# Patient Record
Sex: FEMALE | Race: WHITE | NOT HISPANIC OR LATINO | Employment: OTHER | RURAL
[De-identification: names, ages, dates, MRNs, and addresses within clinical notes are randomized per-mention and may not be internally consistent; named-entity substitution may affect disease eponyms.]

---

## 2017-10-11 ENCOUNTER — HISTORICAL (OUTPATIENT)
Dept: ADMINISTRATIVE | Facility: HOSPITAL | Age: 57
End: 2017-10-11

## 2017-10-13 LAB
LAB AP CLINICAL INFORMATION: NORMAL
LAB AP DIAGNOSIS - HISTORICAL: NORMAL
LAB AP GROSS PATHOLOGY - HISTORICAL: NORMAL
LAB AP SPECIMEN SUBMITTED - HISTORICAL: NORMAL

## 2020-09-23 ENCOUNTER — HISTORICAL (OUTPATIENT)
Dept: ADMINISTRATIVE | Facility: HOSPITAL | Age: 60
End: 2020-09-23

## 2020-10-13 ENCOUNTER — HISTORICAL (OUTPATIENT)
Dept: ADMINISTRATIVE | Facility: HOSPITAL | Age: 60
End: 2020-10-13

## 2020-10-13 LAB
CHOLEST SERPL-MCNC: 256 MG/DL
CHOLEST/HDLC SERPL: 3.5 {RATIO}
GLUCOSE SERPL-MCNC: 98 MG/DL (ref 74–106)
HDLC SERPL-MCNC: 73 MG/DL
LDLC SERPL CALC-MCNC: 151 MG/DL
TRIGL SERPL-MCNC: 159 MG/DL

## 2021-06-16 DIAGNOSIS — E78.2 MIXED HYPERLIPIDEMIA: Primary | ICD-10-CM

## 2021-06-17 RX ORDER — LOVASTATIN 10 MG/1
TABLET ORAL
Qty: 30 TABLET | Refills: 0 | Status: SHIPPED | OUTPATIENT
Start: 2021-06-17 | End: 2021-07-13

## 2021-07-13 DIAGNOSIS — E78.2 MIXED HYPERLIPIDEMIA: ICD-10-CM

## 2021-07-13 RX ORDER — LOVASTATIN 10 MG/1
TABLET ORAL
Qty: 30 TABLET | Refills: 2 | Status: SHIPPED | OUTPATIENT
Start: 2021-07-13 | End: 2021-09-09

## 2021-09-09 DIAGNOSIS — E78.2 MIXED HYPERLIPIDEMIA: ICD-10-CM

## 2021-09-09 RX ORDER — LOVASTATIN 10 MG/1
TABLET ORAL
Qty: 30 TABLET | Refills: 2 | Status: SHIPPED | OUTPATIENT
Start: 2021-09-09 | End: 2022-02-24

## 2021-09-13 RX ORDER — FLUTICASONE PROPIONATE 0.5 MG/G
CREAM TOPICAL
Qty: 30 G | Refills: 2 | Status: SHIPPED | OUTPATIENT
Start: 2021-09-13 | End: 2022-08-29

## 2021-10-20 ENCOUNTER — OFFICE VISIT (OUTPATIENT)
Dept: FAMILY MEDICINE | Facility: CLINIC | Age: 61
End: 2021-10-20

## 2021-10-20 VITALS
SYSTOLIC BLOOD PRESSURE: 110 MMHG | BODY MASS INDEX: 27.56 KG/M2 | OXYGEN SATURATION: 97 % | TEMPERATURE: 97 F | HEART RATE: 80 BPM | WEIGHT: 146 LBS | RESPIRATION RATE: 18 BRPM | HEIGHT: 61 IN | DIASTOLIC BLOOD PRESSURE: 70 MMHG

## 2021-10-20 DIAGNOSIS — Z00.00 ROUTINE MEDICAL EXAM: Primary | ICD-10-CM

## 2021-10-20 DIAGNOSIS — Z13.220 SCREENING FOR LIPID DISORDERS: ICD-10-CM

## 2021-10-20 DIAGNOSIS — Z11.59 SCREENING FOR VIRAL DISEASE: ICD-10-CM

## 2021-10-20 DIAGNOSIS — Z13.1 SCREENING FOR DIABETES MELLITUS: ICD-10-CM

## 2021-10-20 LAB
CHOLEST SERPL-MCNC: 189 MG/DL (ref 0–200)
CHOLEST/HDLC SERPL: 2.5 {RATIO}
GLUCOSE SERPL-MCNC: 100 MG/DL (ref 74–106)
HDLC SERPL-MCNC: 77 MG/DL (ref 40–60)
LDLC SERPL CALC-MCNC: 98 MG/DL
LDLC/HDLC SERPL: 1.3 {RATIO}
NONHDLC SERPL-MCNC: 112 MG/DL
TRIGL SERPL-MCNC: 68 MG/DL (ref 35–150)
VLDLC SERPL-MCNC: 14 MG/DL

## 2021-10-20 PROCEDURE — 80061 LIPID PANEL: ICD-10-PCS | Mod: ,,, | Performed by: CLINICAL MEDICAL LABORATORY

## 2021-10-20 PROCEDURE — 82947 ASSAY GLUCOSE BLOOD QUANT: CPT | Mod: ,,, | Performed by: CLINICAL MEDICAL LABORATORY

## 2021-10-20 PROCEDURE — 86769 SARS-COV-2 COVID-19 ANTIBODY: CPT | Mod: 90,,, | Performed by: CLINICAL MEDICAL LABORATORY

## 2021-10-20 PROCEDURE — 99396 PREV VISIT EST AGE 40-64: CPT | Mod: ,,, | Performed by: NURSE PRACTITIONER

## 2021-10-20 PROCEDURE — 82947 GLUCOSE, FASTING: ICD-10-PCS | Mod: ,,, | Performed by: CLINICAL MEDICAL LABORATORY

## 2021-10-20 PROCEDURE — 80061 LIPID PANEL: CPT | Mod: ,,, | Performed by: CLINICAL MEDICAL LABORATORY

## 2021-10-20 PROCEDURE — 86769 SARS-COV-2 SPIKE AB, SEMI-QUANT, S: ICD-10-PCS | Mod: 90,,, | Performed by: CLINICAL MEDICAL LABORATORY

## 2021-10-20 PROCEDURE — 99396 PR PREVENTIVE VISIT,EST,40-64: ICD-10-PCS | Mod: ,,, | Performed by: NURSE PRACTITIONER

## 2021-10-20 RX ORDER — NAPROXEN 500 MG/1
500 TABLET ORAL 2 TIMES DAILY PRN
COMMUNITY
Start: 2021-09-09 | End: 2023-01-23 | Stop reason: SDUPTHER

## 2021-10-20 RX ORDER — CLORAZEPATE DIPOTASSIUM 3.75 MG/1
3.75 TABLET ORAL 3 TIMES DAILY
COMMUNITY
Start: 2021-10-12 | End: 2022-01-27 | Stop reason: SDUPTHER

## 2021-10-20 RX ORDER — LANSOPRAZOLE 30 MG/1
CAPSULE, DELAYED RELEASE ORAL
COMMUNITY
Start: 2021-10-07 | End: 2023-01-23 | Stop reason: SDUPTHER

## 2021-10-22 LAB
M SARS-COV-2 SPIKE AB, INTERP, S: POSITIVE
M SARS-COV-2 SPIKE AB, QUANT, S: 64 U/ML

## 2022-01-04 ENCOUNTER — OFFICE VISIT (OUTPATIENT)
Dept: FAMILY MEDICINE | Facility: CLINIC | Age: 62
End: 2022-01-04
Payer: COMMERCIAL

## 2022-01-04 VITALS
SYSTOLIC BLOOD PRESSURE: 122 MMHG | HEIGHT: 61 IN | RESPIRATION RATE: 17 BRPM | HEART RATE: 78 BPM | WEIGHT: 141 LBS | DIASTOLIC BLOOD PRESSURE: 74 MMHG | OXYGEN SATURATION: 97 % | TEMPERATURE: 96 F | BODY MASS INDEX: 26.62 KG/M2

## 2022-01-04 DIAGNOSIS — F41.9 ANXIETY: ICD-10-CM

## 2022-01-04 DIAGNOSIS — Z20.822 EXPOSURE TO COVID-19 VIRUS: ICD-10-CM

## 2022-01-04 DIAGNOSIS — J40 BRONCHITIS: Primary | ICD-10-CM

## 2022-01-04 LAB
CTP QC/QA: YES
FLUAV AG NPH QL: NEGATIVE
FLUBV AG NPH QL: NEGATIVE
SARS-COV-2 AG RESP QL IA.RAPID: NEGATIVE

## 2022-01-04 PROCEDURE — 1159F PR MEDICATION LIST DOCUMENTED IN MEDICAL RECORD: ICD-10-PCS | Mod: CPTII,,, | Performed by: NURSE PRACTITIONER

## 2022-01-04 PROCEDURE — 99213 PR OFFICE/OUTPT VISIT, EST, LEVL III, 20-29 MIN: ICD-10-PCS | Mod: 25,,, | Performed by: NURSE PRACTITIONER

## 2022-01-04 PROCEDURE — 3074F SYST BP LT 130 MM HG: CPT | Mod: CPTII,,, | Performed by: NURSE PRACTITIONER

## 2022-01-04 PROCEDURE — 87428 SARSCOV & INF VIR A&B AG IA: CPT | Mod: QW,,, | Performed by: NURSE PRACTITIONER

## 2022-01-04 PROCEDURE — 87428 POCT SARS-COV2 (COVID) WITH FLU ANTIGEN: ICD-10-PCS | Mod: QW,,, | Performed by: NURSE PRACTITIONER

## 2022-01-04 PROCEDURE — 1160F RVW MEDS BY RX/DR IN RCRD: CPT | Mod: CPTII,,, | Performed by: NURSE PRACTITIONER

## 2022-01-04 PROCEDURE — 3074F PR MOST RECENT SYSTOLIC BLOOD PRESSURE < 130 MM HG: ICD-10-PCS | Mod: CPTII,,, | Performed by: NURSE PRACTITIONER

## 2022-01-04 PROCEDURE — 3078F DIAST BP <80 MM HG: CPT | Mod: CPTII,,, | Performed by: NURSE PRACTITIONER

## 2022-01-04 PROCEDURE — 1160F PR REVIEW ALL MEDS BY PRESCRIBER/CLIN PHARMACIST DOCUMENTED: ICD-10-PCS | Mod: CPTII,,, | Performed by: NURSE PRACTITIONER

## 2022-01-04 PROCEDURE — 96372 PR INJECTION,THERAP/PROPH/DIAG2ST, IM OR SUBCUT: ICD-10-PCS | Mod: ,,, | Performed by: NURSE PRACTITIONER

## 2022-01-04 PROCEDURE — 3008F PR BODY MASS INDEX (BMI) DOCUMENTED: ICD-10-PCS | Mod: CPTII,,, | Performed by: NURSE PRACTITIONER

## 2022-01-04 PROCEDURE — 3008F BODY MASS INDEX DOCD: CPT | Mod: CPTII,,, | Performed by: NURSE PRACTITIONER

## 2022-01-04 PROCEDURE — 1159F MED LIST DOCD IN RCRD: CPT | Mod: CPTII,,, | Performed by: NURSE PRACTITIONER

## 2022-01-04 PROCEDURE — 3078F PR MOST RECENT DIASTOLIC BLOOD PRESSURE < 80 MM HG: ICD-10-PCS | Mod: CPTII,,, | Performed by: NURSE PRACTITIONER

## 2022-01-04 PROCEDURE — 99213 OFFICE O/P EST LOW 20 MIN: CPT | Mod: 25,,, | Performed by: NURSE PRACTITIONER

## 2022-01-04 PROCEDURE — 96372 THER/PROPH/DIAG INJ SC/IM: CPT | Mod: ,,, | Performed by: NURSE PRACTITIONER

## 2022-01-04 RX ORDER — PROMETHAZINE HYDROCHLORIDE AND DEXTROMETHORPHAN HYDROBROMIDE 6.25; 15 MG/5ML; MG/5ML
5 SYRUP ORAL NIGHTLY PRN
Qty: 180 ML | Refills: 0 | Status: SHIPPED | OUTPATIENT
Start: 2022-01-04 | End: 2022-01-14

## 2022-01-04 RX ORDER — METHYLPREDNISOLONE 4 MG/1
4 TABLET ORAL
Qty: 21 TABLET | Refills: 0 | Status: SHIPPED | OUTPATIENT
Start: 2022-01-04 | End: 2022-01-10

## 2022-01-04 RX ORDER — LINCOMYCIN HYDROCHLORIDE 300 MG/ML
600 INJECTION, SOLUTION INTRAMUSCULAR; INTRAVENOUS; SUBCONJUNCTIVAL
Status: COMPLETED | OUTPATIENT
Start: 2022-01-04 | End: 2022-01-04

## 2022-01-04 RX ORDER — AZITHROMYCIN 250 MG/1
TABLET, FILM COATED ORAL
Qty: 6 TABLET | Refills: 0 | Status: SHIPPED | OUTPATIENT
Start: 2022-01-04 | End: 2022-01-09

## 2022-01-04 RX ORDER — ALBUTEROL SULFATE 2.5 MG/.5ML
2.5 SOLUTION RESPIRATORY (INHALATION) EVERY 6 HOURS PRN
Qty: 1 EACH | Refills: 1 | Status: SHIPPED | OUTPATIENT
Start: 2022-01-04 | End: 2022-01-14

## 2022-01-04 RX ADMIN — LINCOMYCIN HYDROCHLORIDE 600 MG: 300 INJECTION, SOLUTION INTRAMUSCULAR; INTRAVENOUS; SUBCONJUNCTIVAL at 11:01

## 2022-01-14 DIAGNOSIS — J40 BRONCHITIS: ICD-10-CM

## 2022-01-14 RX ORDER — ALBUTEROL SULFATE 0.83 MG/ML
SOLUTION RESPIRATORY (INHALATION)
Qty: 75 ML | Refills: 1 | Status: SHIPPED | OUTPATIENT
Start: 2022-01-14 | End: 2022-10-13 | Stop reason: SDUPTHER

## 2022-01-27 DIAGNOSIS — F41.9 ANXIETY: Primary | ICD-10-CM

## 2022-01-28 RX ORDER — CLORAZEPATE DIPOTASSIUM 3.75 MG/1
3.75 TABLET ORAL 2 TIMES DAILY
Qty: 60 TABLET | Refills: 0 | Status: SHIPPED | OUTPATIENT
Start: 2022-01-28 | End: 2022-08-29 | Stop reason: SDUPTHER

## 2022-02-24 DIAGNOSIS — E78.2 MIXED HYPERLIPIDEMIA: ICD-10-CM

## 2022-02-24 DIAGNOSIS — F41.9 ANXIETY: ICD-10-CM

## 2022-02-24 RX ORDER — LOVASTATIN 10 MG/1
TABLET ORAL
Qty: 30 TABLET | Refills: 2 | Status: SHIPPED | OUTPATIENT
Start: 2022-02-24 | End: 2022-05-01

## 2022-02-25 RX ORDER — CLORAZEPATE DIPOTASSIUM 3.75 MG/1
TABLET ORAL
Qty: 60 TABLET | Refills: 0 | OUTPATIENT
Start: 2022-02-25

## 2022-04-04 LAB — PAP RECOMMENDATION EXT: NORMAL

## 2022-04-05 LAB — BCS RECOMMENDATION EXT: NORMAL

## 2022-04-29 DIAGNOSIS — E78.2 MIXED HYPERLIPIDEMIA: ICD-10-CM

## 2022-05-01 RX ORDER — LOVASTATIN 10 MG/1
TABLET ORAL
Qty: 30 TABLET | Refills: 2 | Status: SHIPPED | OUTPATIENT
Start: 2022-05-01 | End: 2022-08-29 | Stop reason: SDUPTHER

## 2022-08-01 DIAGNOSIS — E78.2 MIXED HYPERLIPIDEMIA: ICD-10-CM

## 2022-08-02 RX ORDER — LOVASTATIN 10 MG/1
TABLET ORAL
Qty: 30 TABLET | Refills: 2 | OUTPATIENT
Start: 2022-08-02

## 2022-08-29 ENCOUNTER — OFFICE VISIT (OUTPATIENT)
Dept: FAMILY MEDICINE | Facility: CLINIC | Age: 62
End: 2022-08-29
Payer: COMMERCIAL

## 2022-08-29 VITALS
SYSTOLIC BLOOD PRESSURE: 118 MMHG | WEIGHT: 151.81 LBS | OXYGEN SATURATION: 99 % | HEART RATE: 79 BPM | RESPIRATION RATE: 18 BRPM | BODY MASS INDEX: 28.66 KG/M2 | DIASTOLIC BLOOD PRESSURE: 72 MMHG | HEIGHT: 61 IN

## 2022-08-29 DIAGNOSIS — F41.9 ANXIETY: ICD-10-CM

## 2022-08-29 DIAGNOSIS — N20.0 KIDNEY STONE: Primary | ICD-10-CM

## 2022-08-29 DIAGNOSIS — Z79.899 ENCOUNTER FOR LONG-TERM (CURRENT) USE OF MEDICATIONS: ICD-10-CM

## 2022-08-29 DIAGNOSIS — Z87.442 PERSONAL HISTORY OF KIDNEY STONES: ICD-10-CM

## 2022-08-29 DIAGNOSIS — E78.2 MIXED HYPERLIPIDEMIA: ICD-10-CM

## 2022-08-29 DIAGNOSIS — R30.0 DYSURIA: ICD-10-CM

## 2022-08-29 PROBLEM — J40 BRONCHITIS: Status: RESOLVED | Noted: 2022-01-04 | Resolved: 2022-08-29

## 2022-08-29 LAB
BILIRUB SERPL-MCNC: NEGATIVE MG/DL
BLOOD URINE, POC: NEGATIVE
COLOR, POC UA: YELLOW
GLUCOSE UR QL STRIP: NEGATIVE
KETONES UR QL STRIP: ABNORMAL
LEUKOCYTE ESTERASE URINE, POC: ABNORMAL
NITRITE, POC UA: NEGATIVE
PH, POC UA: 6.5
PROTEIN, POC: NEGATIVE
SPECIFIC GRAVITY, POC UA: 1.01
UROBILINOGEN, POC UA: 0.2

## 2022-08-29 PROCEDURE — 99214 OFFICE O/P EST MOD 30 MIN: CPT | Mod: ,,, | Performed by: NURSE PRACTITIONER

## 2022-08-29 PROCEDURE — 3074F SYST BP LT 130 MM HG: CPT | Mod: CPTII,,, | Performed by: NURSE PRACTITIONER

## 2022-08-29 PROCEDURE — 1160F PR REVIEW ALL MEDS BY PRESCRIBER/CLIN PHARMACIST DOCUMENTED: ICD-10-PCS | Mod: CPTII,,, | Performed by: NURSE PRACTITIONER

## 2022-08-29 PROCEDURE — 87077 CULTURE AEROBIC IDENTIFY: CPT | Mod: ,,, | Performed by: CLINICAL MEDICAL LABORATORY

## 2022-08-29 PROCEDURE — 3078F PR MOST RECENT DIASTOLIC BLOOD PRESSURE < 80 MM HG: ICD-10-PCS | Mod: CPTII,,, | Performed by: NURSE PRACTITIONER

## 2022-08-29 PROCEDURE — 87186 SC STD MICRODIL/AGAR DIL: CPT | Mod: ,,, | Performed by: CLINICAL MEDICAL LABORATORY

## 2022-08-29 PROCEDURE — 87186 CULTURE, URINE: ICD-10-PCS | Mod: ,,, | Performed by: CLINICAL MEDICAL LABORATORY

## 2022-08-29 PROCEDURE — 80305 DRUG TEST PRSMV DIR OPT OBS: CPT | Mod: QW,,, | Performed by: NURSE PRACTITIONER

## 2022-08-29 PROCEDURE — 81003 URINALYSIS AUTO W/O SCOPE: CPT | Mod: QW,59,, | Performed by: NURSE PRACTITIONER

## 2022-08-29 PROCEDURE — 80305 POCT URINE DRUG SCREEN PRESUMP: ICD-10-PCS | Mod: QW,,, | Performed by: NURSE PRACTITIONER

## 2022-08-29 PROCEDURE — 1159F MED LIST DOCD IN RCRD: CPT | Mod: CPTII,,, | Performed by: NURSE PRACTITIONER

## 2022-08-29 PROCEDURE — 3008F BODY MASS INDEX DOCD: CPT | Mod: CPTII,,, | Performed by: NURSE PRACTITIONER

## 2022-08-29 PROCEDURE — 3078F DIAST BP <80 MM HG: CPT | Mod: CPTII,,, | Performed by: NURSE PRACTITIONER

## 2022-08-29 PROCEDURE — 87086 CULTURE, URINE: ICD-10-PCS | Mod: ,,, | Performed by: CLINICAL MEDICAL LABORATORY

## 2022-08-29 PROCEDURE — 1160F RVW MEDS BY RX/DR IN RCRD: CPT | Mod: CPTII,,, | Performed by: NURSE PRACTITIONER

## 2022-08-29 PROCEDURE — 99214 PR OFFICE/OUTPT VISIT, EST, LEVL IV, 30-39 MIN: ICD-10-PCS | Mod: ,,, | Performed by: NURSE PRACTITIONER

## 2022-08-29 PROCEDURE — 81003 POCT URINALYSIS W/O SCOPE: ICD-10-PCS | Mod: QW,59,, | Performed by: NURSE PRACTITIONER

## 2022-08-29 PROCEDURE — 1159F PR MEDICATION LIST DOCUMENTED IN MEDICAL RECORD: ICD-10-PCS | Mod: CPTII,,, | Performed by: NURSE PRACTITIONER

## 2022-08-29 PROCEDURE — 87077 CULTURE, URINE: ICD-10-PCS | Mod: ,,, | Performed by: CLINICAL MEDICAL LABORATORY

## 2022-08-29 PROCEDURE — 3074F PR MOST RECENT SYSTOLIC BLOOD PRESSURE < 130 MM HG: ICD-10-PCS | Mod: CPTII,,, | Performed by: NURSE PRACTITIONER

## 2022-08-29 PROCEDURE — 87086 URINE CULTURE/COLONY COUNT: CPT | Mod: ,,, | Performed by: CLINICAL MEDICAL LABORATORY

## 2022-08-29 PROCEDURE — 3008F PR BODY MASS INDEX (BMI) DOCUMENTED: ICD-10-PCS | Mod: CPTII,,, | Performed by: NURSE PRACTITIONER

## 2022-08-29 RX ORDER — LOVASTATIN 10 MG/1
TABLET ORAL
Qty: 30 TABLET | Refills: 2 | Status: SHIPPED | OUTPATIENT
Start: 2022-08-29 | End: 2022-10-18

## 2022-08-29 RX ORDER — CLORAZEPATE DIPOTASSIUM 3.75 MG/1
3.75 TABLET ORAL 2 TIMES DAILY
Qty: 60 TABLET | Refills: 0 | Status: ON HOLD | OUTPATIENT
Start: 2022-08-29 | End: 2022-11-08 | Stop reason: HOSPADM

## 2022-08-29 RX ORDER — KETOROLAC TROMETHAMINE 30 MG/ML
60 INJECTION, SOLUTION INTRAMUSCULAR; INTRAVENOUS
Status: DISCONTINUED | OUTPATIENT
Start: 2022-08-29 | End: 2022-08-29

## 2022-08-29 NOTE — PROGRESS NOTES
ESVIN Ocasio   [facility]  [address]  [place] [address]     PATIENT NAME: Ami Flores  : 1960  DATE: 22  MRN: [id number]      Billing Provider: ESVIN Ocasio  Level of Service: IN OFFICE/OUTPT VISIT, EST, LEVL IV, 30-39 MIN  Patient PCP Information       Provider PCP Type    ESVIN Ocasio General            Reason for Visit / Chief Complaint: Dysuria (Started 7 weeks ago with burning and pain with urination. Taking OTC medicine. H/O kidney stones and stated that she has passed a few pieces of stone)       Update PCP  Update Chief Complaint         History of Present Illness / Problem Focused Workflow     Ami Flores presents to the clinic c/o dysuria and urinary urgency for approx 7 weeks. Has been taking OTC azo that has helped. States she has a hx of kidney stones and symptoms feel the same. No known fever.  Requesting refills on routine medication.      Review of Systems     Review of Systems   Constitutional: Negative.  Negative for chills and fever.   Eyes:  Negative for visual disturbance.   Respiratory:  Negative for cough, chest tightness and shortness of breath.    Cardiovascular:  Negative for chest pain, palpitations and leg swelling.   Gastrointestinal:  Negative for abdominal pain, constipation, diarrhea, nausea and vomiting.   Endocrine: Negative for cold intolerance, heat intolerance, polydipsia, polyphagia and polyuria.   Genitourinary:  Positive for dysuria and urgency. Negative for vaginal discharge.        Suprapubic discomfort   Neurological:  Negative for dizziness, weakness and headaches.      Medical / Social / Family History     Past Medical History:   Diagnosis Date    Anxiety     Hyperlipidemia     Kidney stones        Past Surgical History:   Procedure Laterality Date    ADENOIDECTOMY      APPENDECTOMY       SECTION      CHOLECYSTECTOMY      ECTOPIC PREGNANCY SURGERY      TONSILLECTOMY         Social  "History  MsFlaca  reports that she has never smoked. She has never used smokeless tobacco. She reports that she does not drink alcohol and does not use drugs.    Family History  Ms.'s family history is not on file.    Medications and Allergies     Medications  Outpatient Medications Marked as Taking for the 8/29/22 encounter (Office Visit) with ESVIN Funez   Medication Sig Dispense Refill    lansoprazole (PREVACID) 30 MG capsule TAKE ONE CAPSULE BY MOUTH EVERY DAY BEFORE a meal      naproxen (NAPROSYN) 500 MG tablet Take 500 mg by mouth 2 (two) times daily.      [DISCONTINUED] clorazepate (TRANXENE) 3.75 MG Tab Take 1 tablet (3.75 mg total) by mouth 2 (two) times daily. 60 tablet 0    [DISCONTINUED] lovastatin (MEVACOR) 10 MG tablet TAKE ONE TABLET BY MOUTH EVERY EVENING with meal 30 tablet 2     Current Facility-Administered Medications for the 8/29/22 encounter (Office Visit) with ESVIN Funez   Medication Dose Route Frequency Provider Last Rate Last Admin    [DISCONTINUED] ketorolac injection 60 mg  60 mg Intramuscular 1 time in Clinic/HOD ESVIN Funez           Allergies  Review of patient's allergies indicates:   Allergen Reactions    Hydrocodone Nausea And Vomiting    Penicillins Nausea Only       Physical Examination     Vitals:    08/29/22 1322   BP: 118/72   BP Location: Left arm   Patient Position: Sitting   BP Method: Medium (Manual)   Pulse: 79   Resp: 18   SpO2: 99%   Weight: 68.9 kg (151 lb 12.8 oz)   Height: 5' 1" (1.549 m)      Physical Exam  Constitutional:       Appearance: Normal appearance.   HENT:      Mouth/Throat:      Mouth: Mucous membranes are moist.   Cardiovascular:      Rate and Rhythm: Normal rate and regular rhythm.      Pulses: Normal pulses.      Heart sounds: Normal heart sounds.   Pulmonary:      Effort: Pulmonary effort is normal. No respiratory distress.      Breath sounds: Normal breath sounds. No wheezing, rhonchi or rales.   Abdominal:      General: " Bowel sounds are normal. There is no distension.      Palpations: Abdomen is soft. There is no mass.      Tenderness: There is abdominal tenderness. There is no right CVA tenderness or left CVA tenderness.      Hernia: No hernia is present.      Comments: Suprapubic tenderness   Musculoskeletal:         General: Normal range of motion.   Skin:     General: Skin is warm and dry.      Coloration: Skin is not jaundiced or pale.   Neurological:      Mental Status: She is alert.   Psychiatric:         Attention and Perception: Attention normal.         Mood and Affect: Mood normal.         Behavior: Behavior normal. Behavior is cooperative.         Thought Content: Thought content does not include suicidal ideation.         Judgment: Judgment normal.        Assessment and Plan (including Health Maintenance)      Problem List  Smart Sets  Document Outside HM   :            Health Maintenance Due   Topic Date Due    Hepatitis C Screening  Never done    Cervical Cancer Screening  Never done    COVID-19 Vaccine (1) Never done    HIV Screening  Never done    TETANUS VACCINE  Never done    Mammogram  Never done    Colorectal Cancer Screening  Never done    Shingles Vaccine (1 of 2) Never done       Problem List Items Addressed This Visit          Psychiatric    Anxiety    Relevant Medications    clorazepate (TRANXENE) 3.75 MG Tab     Other Visit Diagnoses       Kidney stone    -  Primary    KUB obtained and sent to radiology, renal calculi noted on left. Increase fluid intake and pt has toradol she can take if needed.    Dysuria        Urine culture ordered.    Relevant Orders    POCT URINALYSIS W/O SCOPE (Completed)    X-Ray KUB (Completed)    Urine culture    Personal history of kidney stones        Relevant Orders    X-Ray KUB (Completed)    Mixed hyperlipidemia        Relevant Medications    lovastatin (MEVACOR) 10 MG tablet    Encounter for long-term (current) use of medications        Relevant Orders    POCT Urine Drug  Screen Presump          Kidney stone  Comments:  KUB obtained and sent to radiology, renal calculi noted on left. Increase fluid intake and pt has toradol she can take if needed.  Orders:  -     Discontinue: ketorolac injection 60 mg    Dysuria  Comments:  Urine culture ordered.  Orders:  -     POCT URINALYSIS W/O SCOPE  -     X-Ray KUB; Future; Expected date: 08/29/2022  -     Urine culture; Future; Expected date: 08/29/2022    Personal history of kidney stones  -     X-Ray KUB; Future; Expected date: 08/29/2022    Anxiety  Comments:  Will continue current medication as needed. Pt will need to follow up with OB/GYN who has been writing her medication. Denies diversion,  reviewed, UDS order  Orders:  -     clorazepate (TRANXENE) 3.75 MG Tab; Take 1 tablet (3.75 mg total) by mouth 2 (two) times daily.  Dispense: 60 tablet; Refill: 0    Mixed hyperlipidemia  -     lovastatin (MEVACOR) 10 MG tablet; TAKE ONE TABLET BY MOUTH EVERY EVENING with meal  Dispense: 30 tablet; Refill: 2    Encounter for long-term (current) use of medications  -     POCT Urine Drug Screen Presump     Health Maintenance Topics with due status: Not Due       Topic Last Completion Date    Lipid Panel 10/20/2021    Influenza Vaccine Not Due       Procedures     Future Appointments   Date Time Provider Department Center   11/2/2022  9:15 AM ESVIN Funez [facility]        Follow up in about 1 week (around 9/5/2022), or if symptoms worsen or fail to improve.     Signature:  ESVIN Ocasio    Date of encounter: 8/29/22

## 2022-08-30 LAB

## 2022-08-31 LAB — UA COMPLETE W REFLEX CULTURE PNL UR: ABNORMAL

## 2022-08-31 RX ORDER — SULFAMETHOXAZOLE AND TRIMETHOPRIM 800; 160 MG/1; MG/1
1 TABLET ORAL 2 TIMES DAILY
Qty: 10 TABLET | Refills: 0 | Status: SHIPPED | OUTPATIENT
Start: 2022-08-31 | End: 2022-09-16

## 2022-09-16 ENCOUNTER — OFFICE VISIT (OUTPATIENT)
Dept: FAMILY MEDICINE | Facility: CLINIC | Age: 62
End: 2022-09-16
Payer: COMMERCIAL

## 2022-09-16 VITALS
TEMPERATURE: 98 F | DIASTOLIC BLOOD PRESSURE: 68 MMHG | WEIGHT: 152.63 LBS | BODY MASS INDEX: 28.82 KG/M2 | HEIGHT: 61 IN | RESPIRATION RATE: 16 BRPM | OXYGEN SATURATION: 98 % | HEART RATE: 72 BPM | SYSTOLIC BLOOD PRESSURE: 130 MMHG

## 2022-09-16 DIAGNOSIS — E78.2 MIXED HYPERLIPIDEMIA: ICD-10-CM

## 2022-09-16 DIAGNOSIS — Z00.00 ROUTINE MEDICAL EXAM: Primary | ICD-10-CM

## 2022-09-16 DIAGNOSIS — Z13.1 SCREENING FOR DIABETES MELLITUS: ICD-10-CM

## 2022-09-16 DIAGNOSIS — N20.0 RENAL CALCULI: ICD-10-CM

## 2022-09-16 DIAGNOSIS — R53.83 FATIGUE, UNSPECIFIED TYPE: ICD-10-CM

## 2022-09-16 DIAGNOSIS — N30.90 CYSTITIS: ICD-10-CM

## 2022-09-16 LAB
ALBUMIN SERPL BCP-MCNC: 4.1 G/DL (ref 3.5–5)
ALBUMIN/GLOB SERPL: 1.4 {RATIO}
ALP SERPL-CCNC: 90 U/L (ref 50–130)
ALT SERPL W P-5'-P-CCNC: 26 U/L (ref 13–56)
ANION GAP SERPL CALCULATED.3IONS-SCNC: 13 MMOL/L (ref 7–16)
AST SERPL W P-5'-P-CCNC: 25 U/L (ref 15–37)
BASOPHILS # BLD AUTO: 0.06 K/UL (ref 0–0.2)
BASOPHILS NFR BLD AUTO: 0.7 % (ref 0–1)
BILIRUB SERPL-MCNC: 0.5 MG/DL (ref ?–1.2)
BILIRUB SERPL-MCNC: ABNORMAL MG/DL
BLOOD URINE, POC: ABNORMAL
BUN SERPL-MCNC: 16 MG/DL (ref 7–18)
BUN/CREAT SERPL: 21 (ref 6–20)
CALCIUM SERPL-MCNC: 8.9 MG/DL (ref 8.5–10.1)
CHLORIDE SERPL-SCNC: 105 MMOL/L (ref 98–107)
CHOLEST SERPL-MCNC: 181 MG/DL (ref 0–200)
CHOLEST/HDLC SERPL: 2.4 {RATIO}
CK SERPL-CCNC: 76 U/L (ref 26–192)
CO2 SERPL-SCNC: 26 MMOL/L (ref 21–32)
COLOR, POC UA: YELLOW
CREAT SERPL-MCNC: 0.76 MG/DL (ref 0.55–1.02)
DIFFERENTIAL METHOD BLD: ABNORMAL
EGFR (NO RACE VARIABLE) (RUSH/TITUS): 89 ML/MIN/1.73M²
EOSINOPHIL # BLD AUTO: 0.08 K/UL (ref 0–0.5)
EOSINOPHIL NFR BLD AUTO: 1 % (ref 1–4)
ERYTHROCYTE [DISTWIDTH] IN BLOOD BY AUTOMATED COUNT: 13.9 % (ref 11.5–14.5)
EST. AVERAGE GLUCOSE BLD GHB EST-MCNC: 114 MG/DL
GLOBULIN SER-MCNC: 3 G/DL (ref 2–4)
GLUCOSE SERPL-MCNC: 94 MG/DL (ref 74–106)
GLUCOSE UR QL STRIP: ABNORMAL
HBA1C MFR BLD HPLC: 6 % (ref 4.5–6.6)
HCT VFR BLD AUTO: 37.7 % (ref 38–47)
HDLC SERPL-MCNC: 74 MG/DL (ref 40–60)
HGB BLD-MCNC: 12.2 G/DL (ref 12–16)
IMM GRANULOCYTES # BLD AUTO: 0.02 K/UL (ref 0–0.04)
IMM GRANULOCYTES NFR BLD: 0.2 % (ref 0–0.4)
KETONES UR QL STRIP: ABNORMAL
LDLC SERPL CALC-MCNC: 92 MG/DL
LDLC/HDLC SERPL: 1.2 {RATIO}
LEUKOCYTE ESTERASE URINE, POC: ABNORMAL
LYMPHOCYTES # BLD AUTO: 2.57 K/UL (ref 1–4.8)
LYMPHOCYTES NFR BLD AUTO: 32 % (ref 27–41)
MCH RBC QN AUTO: 28.3 PG (ref 27–31)
MCHC RBC AUTO-ENTMCNC: 32.4 G/DL (ref 32–36)
MCV RBC AUTO: 87.5 FL (ref 80–96)
MONOCYTES # BLD AUTO: 0.32 K/UL (ref 0–0.8)
MONOCYTES NFR BLD AUTO: 4 % (ref 2–6)
MPC BLD CALC-MCNC: 10.7 FL (ref 9.4–12.4)
NEUTROPHILS # BLD AUTO: 4.97 K/UL (ref 1.8–7.7)
NEUTROPHILS NFR BLD AUTO: 62.1 % (ref 53–65)
NITRITE, POC UA: ABNORMAL
NONHDLC SERPL-MCNC: 107 MG/DL
NRBC # BLD AUTO: 0 X10E3/UL
NRBC, AUTO (.00): 0 %
PH, POC UA: 6
PLATELET # BLD AUTO: 300 K/UL (ref 150–400)
POTASSIUM SERPL-SCNC: 4.1 MMOL/L (ref 3.5–5.1)
PROT SERPL-MCNC: 7.1 G/DL (ref 6.4–8.2)
PROTEIN, POC: ABNORMAL
RBC # BLD AUTO: 4.31 M/UL (ref 4.2–5.4)
SODIUM SERPL-SCNC: 140 MMOL/L (ref 136–145)
SPECIFIC GRAVITY, POC UA: 1.01
TRIGL SERPL-MCNC: 76 MG/DL (ref 35–150)
TSH SERPL DL<=0.005 MIU/L-ACNC: 3.57 UIU/ML (ref 0.36–3.74)
UROBILINOGEN, POC UA: 0.2
VLDLC SERPL-MCNC: 15 MG/DL
WBC # BLD AUTO: 8.02 K/UL (ref 4.5–11)

## 2022-09-16 PROCEDURE — 3008F PR BODY MASS INDEX (BMI) DOCUMENTED: ICD-10-PCS | Mod: CPTII,,, | Performed by: NURSE PRACTITIONER

## 2022-09-16 PROCEDURE — 3075F SYST BP GE 130 - 139MM HG: CPT | Mod: CPTII,,, | Performed by: NURSE PRACTITIONER

## 2022-09-16 PROCEDURE — 1159F MED LIST DOCD IN RCRD: CPT | Mod: CPTII,,, | Performed by: NURSE PRACTITIONER

## 2022-09-16 PROCEDURE — 1159F PR MEDICATION LIST DOCUMENTED IN MEDICAL RECORD: ICD-10-PCS | Mod: CPTII,,, | Performed by: NURSE PRACTITIONER

## 2022-09-16 PROCEDURE — 82550 ASSAY OF CK (CPK): CPT | Mod: ,,, | Performed by: CLINICAL MEDICAL LABORATORY

## 2022-09-16 PROCEDURE — 1160F PR REVIEW ALL MEDS BY PRESCRIBER/CLIN PHARMACIST DOCUMENTED: ICD-10-PCS | Mod: CPTII,,, | Performed by: NURSE PRACTITIONER

## 2022-09-16 PROCEDURE — 1160F RVW MEDS BY RX/DR IN RCRD: CPT | Mod: CPTII,,, | Performed by: NURSE PRACTITIONER

## 2022-09-16 PROCEDURE — 81003 URINALYSIS AUTO W/O SCOPE: CPT | Mod: QW,,, | Performed by: NURSE PRACTITIONER

## 2022-09-16 PROCEDURE — 3078F DIAST BP <80 MM HG: CPT | Mod: CPTII,,, | Performed by: NURSE PRACTITIONER

## 2022-09-16 PROCEDURE — 99396 PR PREVENTIVE VISIT,EST,40-64: ICD-10-PCS | Mod: ,,, | Performed by: NURSE PRACTITIONER

## 2022-09-16 PROCEDURE — 3044F PR MOST RECENT HEMOGLOBIN A1C LEVEL <7.0%: ICD-10-PCS | Mod: CPTII,,, | Performed by: NURSE PRACTITIONER

## 2022-09-16 PROCEDURE — 80061 LIPID PANEL: CPT | Mod: ,,, | Performed by: CLINICAL MEDICAL LABORATORY

## 2022-09-16 PROCEDURE — 99396 PREV VISIT EST AGE 40-64: CPT | Mod: ,,, | Performed by: NURSE PRACTITIONER

## 2022-09-16 PROCEDURE — 80050 PR  GENERAL HEALTH PANEL: ICD-10-PCS | Mod: ,,, | Performed by: CLINICAL MEDICAL LABORATORY

## 2022-09-16 PROCEDURE — 82550 CK: ICD-10-PCS | Mod: ,,, | Performed by: CLINICAL MEDICAL LABORATORY

## 2022-09-16 PROCEDURE — 3044F HG A1C LEVEL LT 7.0%: CPT | Mod: CPTII,,, | Performed by: NURSE PRACTITIONER

## 2022-09-16 PROCEDURE — 80061 LIPID PANEL: ICD-10-PCS | Mod: ,,, | Performed by: CLINICAL MEDICAL LABORATORY

## 2022-09-16 PROCEDURE — 80050 GENERAL HEALTH PANEL: CPT | Mod: ,,, | Performed by: CLINICAL MEDICAL LABORATORY

## 2022-09-16 PROCEDURE — 83036 HEMOGLOBIN GLYCOSYLATED A1C: CPT | Mod: ,,, | Performed by: CLINICAL MEDICAL LABORATORY

## 2022-09-16 PROCEDURE — 3075F PR MOST RECENT SYSTOLIC BLOOD PRESS GE 130-139MM HG: ICD-10-PCS | Mod: CPTII,,, | Performed by: NURSE PRACTITIONER

## 2022-09-16 PROCEDURE — 81003 POCT URINALYSIS W/O SCOPE: ICD-10-PCS | Mod: QW,,, | Performed by: NURSE PRACTITIONER

## 2022-09-16 PROCEDURE — 83036 HEMOGLOBIN A1C: ICD-10-PCS | Mod: ,,, | Performed by: CLINICAL MEDICAL LABORATORY

## 2022-09-16 PROCEDURE — 3008F BODY MASS INDEX DOCD: CPT | Mod: CPTII,,, | Performed by: NURSE PRACTITIONER

## 2022-09-16 PROCEDURE — 3078F PR MOST RECENT DIASTOLIC BLOOD PRESSURE < 80 MM HG: ICD-10-PCS | Mod: CPTII,,, | Performed by: NURSE PRACTITIONER

## 2022-09-16 RX ORDER — PHENAZOPYRIDINE HYDROCHLORIDE 200 MG/1
200 TABLET, FILM COATED ORAL 3 TIMES DAILY
COMMUNITY
Start: 2022-08-29 | End: 2022-09-16

## 2022-09-16 NOTE — PROGRESS NOTES
09/16/22 1017   Depression Patient Health Questionnaire (PHQ-2)   Over the last two weeks how often have you been bothered by little interest or pleasure in doing things 3   Over the last two weeks how often have you been bothered by feeling down, depressed or hopeless 1   PHQ-2 Total Score 4   Depression Patient Health Questionnaire (PHQ-9)   Over the last two weeks how often have you been bothered by trouble falling or staying asleep, or sleeping too much 1   Over the last two weeks how often have you been bothered by feeling tired or having little energy 3   Over the last two weeks how often have you been bothered by a poor appetite or overeating 1   Over the last two weeks how often have you been bothered by feeling bad about yourself - or that you are a failure or have let yourself or your family down 0   Over the last two weeks how often have you been bothered by trouble concentrating on things, such as reading the newspaper or watching television 0   Over the last two weeks how often have you been bothered by moving or speaking so slowly that other people could have noticed. Or the opposite - being so fidgety or restless that you have been moving around a lot more than usual. 0   Over the last two weeks how often have you been bothered by thoughts that you would be better off dead, or of hurting yourself 0   If you checked off any problems, how difficult have these problems made it for you to do your work, take care of things at home or get along with other people? Somewhat difficult   PHQ-9 Score 9   PHQ-9 Interpretation Mild

## 2022-09-16 NOTE — PROGRESS NOTES
ESVIN Ocasio   [facility]  [address]  [place] [address]     PATIENT NAME: Ami Flores  : 1960  DATE: 22  MRN: [id number]      Billing Provider: ESVIN Ocasio  Level of Service: OK PREVENTIVE VISIT,EST,40-64  Patient PCP Information       Provider PCP Type    ESVIN Ocasio General            Reason for Visit / Chief Complaint: Annual Exam ([facility]), Insomnia, and Fatigue (Also having over whelming feelings of sadness on and off since July and has never had problems with depression)       Update PCP  Update Chief Complaint         History of Present Illness / Problem Focused Workflow     Ami Flores presents to the clinic for annual wellness visit.  Has been having trouble with depression and fatigue for the past 2 months.      Review of Systems     Review of Systems   Constitutional:  Positive for fatigue. Negative for appetite change and unexpected weight change.   HENT:  Negative for trouble swallowing.    Eyes:  Negative for visual disturbance.   Respiratory:  Negative for cough, chest tightness and shortness of breath.    Cardiovascular:  Negative for chest pain, palpitations and leg swelling.   Gastrointestinal:  Negative for abdominal pain, blood in stool, nausea and vomiting.   Endocrine: Negative for cold intolerance, heat intolerance, polydipsia, polyphagia and polyuria.   Genitourinary:  Negative for frequency, pelvic pain and urgency.   Integumentary:  Negative for breast discharge and breast tenderness.   Neurological:  Negative for weakness and headaches.   Breast: Negative for tenderness     Medical / Social / Family History     Past Medical History:   Diagnosis Date    Anxiety     Hyperlipidemia     Kidney stones        Past Surgical History:   Procedure Laterality Date    ADENOIDECTOMY      APPENDECTOMY       SECTION      CHOLECYSTECTOMY      ECTOPIC PREGNANCY SURGERY      TONSILLECTOMY         Social History  Ms.   "reports that she has never smoked. She has never been exposed to tobacco smoke. She has never used smokeless tobacco. She reports that she does not drink alcohol and does not use drugs.    Family History  Ms.'s family history includes Diabetes in her paternal grandmother; No Known Problems in her daughter, maternal grandfather, maternal grandmother, paternal grandfather, and sister; Stroke in her father; Thyroid disease in her mother; Ulcerative colitis in her son.    Medications and Allergies     Medications  Outpatient Medications Marked as Taking for the 9/16/22 encounter (Office Visit) with ESVIN Funez   Medication Sig Dispense Refill    albuterol (PROVENTIL) 2.5 mg /3 mL (0.083 %) nebulizer solution USE ONE vial in NEBULIZER EVERY 6 HOURS AS NEEDED 75 mL 1    clorazepate (TRANXENE) 3.75 MG Tab Take 1 tablet (3.75 mg total) by mouth 2 (two) times daily. (Patient taking differently: Take 3.75 mg by mouth 2 (two) times daily as needed.) 60 tablet 0    lansoprazole (PREVACID) 30 MG capsule TAKE ONE CAPSULE BY MOUTH EVERY DAY BEFORE a meal      lovastatin (MEVACOR) 10 MG tablet TAKE ONE TABLET BY MOUTH EVERY EVENING with meal 30 tablet 2    naproxen (NAPROSYN) 500 MG tablet Take 500 mg by mouth 2 (two) times daily as needed.      [DISCONTINUED] phenazopyridine (PYRIDIUM) 200 MG tablet Take 200 mg by mouth 3 (three) times daily.         Allergies  Review of patient's allergies indicates:   Allergen Reactions    Hydrocodone Nausea And Vomiting    Penicillins Nausea Only       Physical Examination     Vitals:    09/16/22 1008   BP: 130/68   Patient Position: Sitting   Pulse: 72   Resp: 16   Temp: 97.7 °F (36.5 °C)   TempSrc: Oral   SpO2: 98%   Weight: 69.2 kg (152 lb 9.6 oz)   Height: 5' 1" (1.549 m)      Physical Exam  Constitutional:       General: She is not in acute distress.     Appearance: Normal appearance.   Eyes:      General: No scleral icterus.     Conjunctiva/sclera: Conjunctivae normal.   Neck: "      Thyroid: No thyromegaly.      Vascular: No carotid bruit.   Cardiovascular:      Rate and Rhythm: Normal rate and regular rhythm.      Pulses:           Carotid pulses are 3+ on the right side and 3+ on the left side.       Posterior tibial pulses are 3+ on the right side and 3+ on the left side.      Heart sounds: Normal heart sounds. No murmur heard.  Pulmonary:      Effort: Pulmonary effort is normal. No accessory muscle usage or respiratory distress.      Breath sounds: Normal breath sounds. No wheezing, rhonchi or rales.   Abdominal:      General: Bowel sounds are normal. There is no distension.      Palpations: Abdomen is soft.      Tenderness: There is no abdominal tenderness.   Musculoskeletal:         General: Normal range of motion.      Cervical back: Neck supple.      Right lower leg: No edema.      Left lower leg: No edema.   Skin:     General: Skin is warm and dry.      Capillary Refill: Capillary refill takes less than 2 seconds.      Coloration: Skin is not jaundiced or pale.   Neurological:      Mental Status: She is alert and oriented to person, place, and time.      Gait: Gait is intact.   Psychiatric:         Mood and Affect: Mood normal. Mood is not anxious. Affect is not inappropriate.         Behavior: Behavior normal.         Thought Content: Thought content does not include suicidal ideation.         Judgment: Judgment normal.        Assessment and Plan (including Health Maintenance)      Problem List  Smart Sets  Document Outside HM   :    Lab Results   Component Value Date    CHOL 181 09/16/2022    CHOL 189 10/20/2021    CHOL 256 10/13/2020     Lab Results   Component Value Date    HDL 74 (H) 09/16/2022    HDL 77 (H) 10/20/2021    HDL 73 10/13/2020     Lab Results   Component Value Date    LDLCALC 92 09/16/2022    LDLCALC 98 10/20/2021    LDLCALC 151 10/13/2020     Lab Results   Component Value Date    TRIG 76 09/16/2022    TRIG 68 10/20/2021    TRIG 159 10/13/2020     Lab Results    Component Value Date    CHOLHDL 2.4 09/16/2022    CHOLHDL 2.5 10/20/2021    CHOLHDL 3.5 10/13/2020     CMP  Sodium   Date Value Ref Range Status   09/16/2022 140 136 - 145 mmol/L Final     Potassium   Date Value Ref Range Status   09/16/2022 4.1 3.5 - 5.1 mmol/L Final     Chloride   Date Value Ref Range Status   09/16/2022 105 98 - 107 mmol/L Final     CO2   Date Value Ref Range Status   09/16/2022 26 21 - 32 mmol/L Final     Glucose   Date Value Ref Range Status   09/16/2022 94 74 - 106 mg/dL Final     BUN   Date Value Ref Range Status   09/16/2022 16 7 - 18 mg/dL Final     Creatinine   Date Value Ref Range Status   09/16/2022 0.76 0.55 - 1.02 mg/dL Final     Calcium   Date Value Ref Range Status   09/16/2022 8.9 8.5 - 10.1 mg/dL Final     Total Protein   Date Value Ref Range Status   09/16/2022 7.1 6.4 - 8.2 g/dL Final     Albumin   Date Value Ref Range Status   09/16/2022 4.1 3.5 - 5.0 g/dL Final     Bilirubin, Total   Date Value Ref Range Status   09/16/2022 0.5 >0.0 - 1.2 mg/dL Final     Alk Phos   Date Value Ref Range Status   09/16/2022 90 50 - 130 U/L Final     AST   Date Value Ref Range Status   09/16/2022 25 15 - 37 U/L Final     ALT   Date Value Ref Range Status   09/16/2022 26 13 - 56 U/L Final     Anion Gap   Date Value Ref Range Status   09/16/2022 13 7 - 16 mmol/L Final             Health Maintenance Due   Topic Date Due    Cervical Cancer Screening  Never done    TETANUS VACCINE  Never done    Mammogram  Never done    Shingles Vaccine (1 of 2) Never done           Routine medical exam  Comments:  Will order fasting lipids, CBC and CMP. Continue current medications, diet and exercise.  Orders:  -     CBC Auto Differential; Future; Expected date: 09/16/2022  -     Comprehensive Metabolic Panel; Future; Expected date: 09/16/2022  -     Lipid Panel; Future; Expected date: 09/16/2022    Mixed hyperlipidemia  Comments:  Continue LFLC diet, LDL goal < 70  Orders:  -     Lipid Panel; Future; Expected date:  09/16/2022  -     CK; Future; Expected date: 09/16/2022    Cystitis  Comments:  Increase fluids and continue azo, will send in flomax  Orders:  -     POCT URINALYSIS W/O SCOPE  -     X-Ray KUB; Future; Expected date: 09/16/2022    Fatigue, unspecified type  Comments:  TSH ordered.  Orders:  -     TSH; Future; Expected date: 09/16/2022    Screening for diabetes mellitus  -     Hemoglobin A1C; Future; Expected date: 09/16/2022    Renal calculi  -     tamsulosin (FLOMAX) 0.4 mg Cap; Take 1 capsule (0.4 mg total) by mouth once daily.  Dispense: 30 capsule; Refill: 1     Health Maintenance Topics with due status: Not Due       Topic Last Completion Date    Lipid Panel 09/16/2022       Procedures     Future Appointments   Date Time Provider Department Center   9/18/2023 10:00 AM ESVIN Funez [facility]        Follow up in about 1 month (around 10/16/2022) for follow up on depression.     Signature:  ESVIN Ocasio    Date of encounter: 9/16/22

## 2022-09-18 RX ORDER — TAMSULOSIN HYDROCHLORIDE 0.4 MG/1
0.4 CAPSULE ORAL DAILY
Qty: 30 CAPSULE | Refills: 1 | Status: SHIPPED | OUTPATIENT
Start: 2022-09-18 | End: 2023-09-19

## 2022-09-25 RX ORDER — ERGOCALCIFEROL 1.25 MG/1
50000 CAPSULE ORAL
Qty: 12 CAPSULE | Refills: 1 | Status: SHIPPED | OUTPATIENT
Start: 2022-09-25 | End: 2023-01-23 | Stop reason: SDUPTHER

## 2022-10-03 DIAGNOSIS — R11.2 NAUSEA AND VOMITING, UNSPECIFIED VOMITING TYPE: Primary | ICD-10-CM

## 2022-10-03 RX ORDER — ONDANSETRON 4 MG/1
4 TABLET, FILM COATED ORAL EVERY 8 HOURS PRN
Qty: 12 TABLET | Refills: 1 | Status: SHIPPED | OUTPATIENT
Start: 2022-10-03 | End: 2023-09-19

## 2022-10-03 RX ORDER — ONDANSETRON 4 MG/1
4 TABLET, FILM COATED ORAL EVERY 8 HOURS PRN
COMMUNITY
End: 2022-10-03 | Stop reason: SDUPTHER

## 2022-10-13 ENCOUNTER — OFFICE VISIT (OUTPATIENT)
Dept: FAMILY MEDICINE | Facility: CLINIC | Age: 62
End: 2022-10-13
Payer: COMMERCIAL

## 2022-10-13 VITALS
TEMPERATURE: 98 F | DIASTOLIC BLOOD PRESSURE: 60 MMHG | OXYGEN SATURATION: 97 % | SYSTOLIC BLOOD PRESSURE: 118 MMHG | WEIGHT: 149.81 LBS | HEIGHT: 61 IN | BODY MASS INDEX: 28.28 KG/M2 | RESPIRATION RATE: 18 BRPM | HEART RATE: 89 BPM

## 2022-10-13 DIAGNOSIS — J01.40 ACUTE NON-RECURRENT PANSINUSITIS: ICD-10-CM

## 2022-10-13 DIAGNOSIS — J40 BRONCHITIS: Primary | ICD-10-CM

## 2022-10-13 PROCEDURE — 3078F PR MOST RECENT DIASTOLIC BLOOD PRESSURE < 80 MM HG: ICD-10-PCS | Mod: CPTII,,, | Performed by: NURSE PRACTITIONER

## 2022-10-13 PROCEDURE — 3074F PR MOST RECENT SYSTOLIC BLOOD PRESSURE < 130 MM HG: ICD-10-PCS | Mod: CPTII,,, | Performed by: NURSE PRACTITIONER

## 2022-10-13 PROCEDURE — 3044F PR MOST RECENT HEMOGLOBIN A1C LEVEL <7.0%: ICD-10-PCS | Mod: CPTII,,, | Performed by: NURSE PRACTITIONER

## 2022-10-13 PROCEDURE — 1159F MED LIST DOCD IN RCRD: CPT | Mod: CPTII,,, | Performed by: NURSE PRACTITIONER

## 2022-10-13 PROCEDURE — 1160F RVW MEDS BY RX/DR IN RCRD: CPT | Mod: CPTII,,, | Performed by: NURSE PRACTITIONER

## 2022-10-13 PROCEDURE — 96372 PR INJECTION,THERAP/PROPH/DIAG2ST, IM OR SUBCUT: ICD-10-PCS | Mod: ,,, | Performed by: NURSE PRACTITIONER

## 2022-10-13 PROCEDURE — 3078F DIAST BP <80 MM HG: CPT | Mod: CPTII,,, | Performed by: NURSE PRACTITIONER

## 2022-10-13 PROCEDURE — 1160F PR REVIEW ALL MEDS BY PRESCRIBER/CLIN PHARMACIST DOCUMENTED: ICD-10-PCS | Mod: CPTII,,, | Performed by: NURSE PRACTITIONER

## 2022-10-13 PROCEDURE — 99213 OFFICE O/P EST LOW 20 MIN: CPT | Mod: 25,,, | Performed by: NURSE PRACTITIONER

## 2022-10-13 PROCEDURE — 3074F SYST BP LT 130 MM HG: CPT | Mod: CPTII,,, | Performed by: NURSE PRACTITIONER

## 2022-10-13 PROCEDURE — 99213 PR OFFICE/OUTPT VISIT, EST, LEVL III, 20-29 MIN: ICD-10-PCS | Mod: 25,,, | Performed by: NURSE PRACTITIONER

## 2022-10-13 PROCEDURE — 1159F PR MEDICATION LIST DOCUMENTED IN MEDICAL RECORD: ICD-10-PCS | Mod: CPTII,,, | Performed by: NURSE PRACTITIONER

## 2022-10-13 PROCEDURE — 3044F HG A1C LEVEL LT 7.0%: CPT | Mod: CPTII,,, | Performed by: NURSE PRACTITIONER

## 2022-10-13 PROCEDURE — 96372 THER/PROPH/DIAG INJ SC/IM: CPT | Mod: ,,, | Performed by: NURSE PRACTITIONER

## 2022-10-13 RX ORDER — ALBUTEROL SULFATE 0.83 MG/ML
2.5 SOLUTION RESPIRATORY (INHALATION) 3 TIMES DAILY
Qty: 75 ML | Refills: 1 | Status: SHIPPED | OUTPATIENT
Start: 2022-10-13 | End: 2023-09-19

## 2022-10-13 RX ORDER — LINCOMYCIN HYDROCHLORIDE 300 MG/ML
600 INJECTION, SOLUTION INTRAMUSCULAR; INTRAVENOUS; SUBCONJUNCTIVAL
Status: COMPLETED | OUTPATIENT
Start: 2022-10-13 | End: 2022-10-13

## 2022-10-13 RX ORDER — DOXYCYCLINE 100 MG/1
100 CAPSULE ORAL EVERY 12 HOURS
Qty: 20 CAPSULE | Refills: 0 | Status: ON HOLD | OUTPATIENT
Start: 2022-10-13 | End: 2022-11-08 | Stop reason: HOSPADM

## 2022-10-13 RX ORDER — CHLOPHEDIANOL HCL AND PYRILAMINE MALEATE 12.5; 12.5 MG/5ML; MG/5ML
10 SOLUTION ORAL EVERY 8 HOURS PRN
Qty: 240 ML | Refills: 0 | Status: SHIPPED | OUTPATIENT
Start: 2022-10-13 | End: 2023-09-19

## 2022-10-13 RX ORDER — METHYLPREDNISOLONE ACETATE 40 MG/ML
40 INJECTION, SUSPENSION INTRA-ARTICULAR; INTRALESIONAL; INTRAMUSCULAR; SOFT TISSUE
Status: COMPLETED | OUTPATIENT
Start: 2022-10-13 | End: 2022-10-13

## 2022-10-13 RX ADMIN — LINCOMYCIN HYDROCHLORIDE 600 MG: 300 INJECTION, SOLUTION INTRAMUSCULAR; INTRAVENOUS; SUBCONJUNCTIVAL at 11:10

## 2022-10-13 RX ADMIN — METHYLPREDNISOLONE ACETATE 40 MG: 40 INJECTION, SUSPENSION INTRA-ARTICULAR; INTRALESIONAL; INTRAMUSCULAR; SOFT TISSUE at 11:10

## 2022-10-13 NOTE — PROGRESS NOTES
Suyapa Lott NP   [facility]  [address]  [place]  [address]      PATIENT NAME: Ami Flores  : 1960  DATE: 10/13/22  MRN: [id number]      Billing Provider: Suyapa Lott NP  Level of Service: ND OFFICE/OUTPT VISIT, EST, LEVL III, 20-29 MIN  Patient PCP Information       Provider PCP Type    [facility]            Reason for Visit / Chief Complaint: Cough (Productive cough with thick greenish yellow sputum.  Started getting sick Saturday.  Feels tight in her chest and hurts when she coughs.) and Nasal Congestion (Nasal congestion and runny nose.)       Update PCP  Update Chief Complaint         History of Present Illness / Problem Focused Workflow     Ami Flores presents to the clinic with Cough (Productive cough with thick greenish yellow sputum.  Started getting sick Saturday.  Feels tight in her chest and hurts when she coughs.) and Nasal Congestion (Nasal congestion and runny nose.)     61 year old female presents to clinic with complaints of productive cough, nasal congestion, and chest congestion which started on Saturday. She states she took Advil Cold and Sinus at home and has also been using albuterol nebs. Reports some improvement but not much. She denies fever or known sick contacts. She is requesting refill of Albuterol nebs.     Review of Systems     @Review of Systems   Constitutional:  Negative for activity change, appetite change, fatigue and fever.   HENT:  Positive for nasal congestion, rhinorrhea, sinus pressure/congestion and sore throat. Negative for ear pain.    Eyes:  Negative for pain, redness, visual disturbance and eye dryness.   Respiratory:  Positive for cough. Negative for shortness of breath.    Cardiovascular:  Negative for chest pain and leg swelling.   Gastrointestinal:  Negative for abdominal distention, abdominal pain, constipation and diarrhea.   Endocrine: Negative for cold intolerance, heat intolerance and  polyuria.   Genitourinary:  Negative for bladder incontinence, dysuria, frequency and urgency.   Musculoskeletal:  Negative for arthralgias, gait problem and myalgias.   Integumentary:  Negative for color change, rash and wound.   Allergic/Immunologic: Negative for environmental allergies and food allergies.   Neurological:  Negative for dizziness, weakness, light-headedness and headaches.   Psychiatric/Behavioral:  Negative for behavioral problems and sleep disturbance.      Medical / Social / Family History     Past Medical History:   Diagnosis Date    Anxiety     Hyperlipidemia     Kidney stones        Past Surgical History:   Procedure Laterality Date    ADENOIDECTOMY      APPENDECTOMY       SECTION      CHOLECYSTECTOMY      ECTOPIC PREGNANCY SURGERY      TONSILLECTOMY         Social History  Ms.  reports that she has never smoked. She has never been exposed to tobacco smoke. She has never used smokeless tobacco. She reports that she does not drink alcohol and does not use drugs.    Family History  Ms.'s family history includes Diabetes in her paternal grandmother; No Known Problems in her daughter, maternal grandfather, maternal grandmother, paternal grandfather, and sister; Stroke in her father; Thyroid disease in her mother; Ulcerative colitis in her son.    Medications and Allergies     Medications  Outpatient Medications Marked as Taking for the 10/13/22 encounter (Office Visit) with Suyapa Lott NP   Medication Sig Dispense Refill    albuterol (PROVENTIL) 2.5 mg /3 mL (0.083 %) nebulizer solution Take 3 mLs (2.5 mg total) by nebulization 3 (three) times daily. Rescue 75 mL 1    clorazepate (TRANXENE) 3.75 MG Tab Take 1 tablet (3.75 mg total) by mouth 2 (two) times daily. (Patient taking differently: Take 3.75 mg by mouth 2 (two) times daily as needed.) 60 tablet 0    ergocalciferol (ERGOCALCIFEROL) 50,000 unit Cap Take 1 capsule (50,000 Units total) by mouth every 7 days. 12 capsule 1     lansoprazole (PREVACID) 30 MG capsule TAKE ONE CAPSULE BY MOUTH EVERY DAY BEFORE a meal      naproxen (NAPROSYN) 500 MG tablet Take 500 mg by mouth 2 (two) times daily as needed.      ondansetron (ZOFRAN) 4 MG tablet Take 1 tablet (4 mg total) by mouth every 8 (eight) hours as needed for Nausea. 12 tablet 1    tamsulosin (FLOMAX) 0.4 mg Cap Take 1 capsule (0.4 mg total) by mouth once daily. 30 capsule 1    [DISCONTINUED] albuterol (PROVENTIL) 2.5 mg /3 mL (0.083 %) nebulizer solution USE ONE vial in NEBULIZER EVERY 6 HOURS AS NEEDED 75 mL 1    [DISCONTINUED] lovastatin (MEVACOR) 10 MG tablet TAKE ONE TABLET BY MOUTH EVERY EVENING with meal 30 tablet 2       Allergies  Review of patient's allergies indicates:   Allergen Reactions    Hydrocodone Nausea And Vomiting    Penicillins Nausea Only       Physical Examination     Vitals:    10/13/22 1056   BP: 118/60   Pulse: 89   Resp: 18   Temp: 97.6 °F (36.4 °C)     Physical Exam  Vitals and nursing note reviewed.   HENT:      Head: Normocephalic.      Right Ear: Tympanic membrane normal.      Left Ear: Tympanic membrane normal.      Nose: Congestion and rhinorrhea present. Rhinorrhea is purulent.      Right Turbinates: Pale.      Left Turbinates: Pale.      Right Sinus: Maxillary sinus tenderness and frontal sinus tenderness present.      Left Sinus: Maxillary sinus tenderness and frontal sinus tenderness present.      Mouth/Throat:      Mouth: Mucous membranes are moist.      Pharynx: Oropharyngeal exudate (clear post nasal drainage.) and posterior oropharyngeal erythema present.   Eyes:      Conjunctiva/sclera: Conjunctivae normal.   Cardiovascular:      Rate and Rhythm: Normal rate and regular rhythm.      Pulses: Normal pulses.      Heart sounds: Normal heart sounds.   Pulmonary:      Effort: Pulmonary effort is normal.      Breath sounds: Examination of the right-upper field reveals rhonchi. Examination of the left-upper field reveals rhonchi. Rhonchi present. No  decreased breath sounds or wheezing.   Abdominal:      General: Abdomen is flat. Bowel sounds are normal. There is no distension.      Palpations: Abdomen is soft.   Musculoskeletal:         General: No swelling or tenderness. Normal range of motion.      Cervical back: Normal range of motion.      Right lower leg: No edema.      Left lower leg: No edema.   Skin:     General: Skin is warm and dry.      Capillary Refill: Capillary refill takes less than 2 seconds.   Neurological:      Mental Status: She is alert. Mental status is at baseline.   Psychiatric:         Mood and Affect: Mood normal.         Behavior: Behavior normal.             Lab Results   Component Value Date    WBC 8.02 09/16/2022    HGB 12.2 09/16/2022    HCT 37.7 (L) 09/16/2022    MCV 87.5 09/16/2022     09/16/2022          Sodium   Date Value Ref Range Status   09/16/2022 140 136 - 145 mmol/L Final     Potassium   Date Value Ref Range Status   09/16/2022 4.1 3.5 - 5.1 mmol/L Final     Chloride   Date Value Ref Range Status   09/16/2022 105 98 - 107 mmol/L Final     CO2   Date Value Ref Range Status   09/16/2022 26 21 - 32 mmol/L Final     Glucose   Date Value Ref Range Status   09/16/2022 94 74 - 106 mg/dL Final     BUN   Date Value Ref Range Status   09/16/2022 16 7 - 18 mg/dL Final     Creatinine   Date Value Ref Range Status   09/16/2022 0.76 0.55 - 1.02 mg/dL Final     Calcium   Date Value Ref Range Status   09/16/2022 8.9 8.5 - 10.1 mg/dL Final     Total Protein   Date Value Ref Range Status   09/16/2022 7.1 6.4 - 8.2 g/dL Final     Albumin   Date Value Ref Range Status   09/16/2022 4.1 3.5 - 5.0 g/dL Final     Bilirubin, Total   Date Value Ref Range Status   09/16/2022 0.5 >0.0 - 1.2 mg/dL Final     Alk Phos   Date Value Ref Range Status   09/16/2022 90 50 - 130 U/L Final     AST   Date Value Ref Range Status   09/16/2022 25 15 - 37 U/L Final     ALT   Date Value Ref Range Status   09/16/2022 26 13 - 56 U/L Final     Anion Gap   Date  Value Ref Range Status   09/16/2022 13 7 - 16 mmol/L Final      X-Ray KUB  Narrative: EXAMINATION:  XR KUB    CLINICAL HISTORY:  Dysuria    COMPARISON:  KUB August 29, 2022    TECHNIQUE:  Single frontal view of the abdomen.    FINDINGS:  Nonspecific bowel gas pattern.  Suspect a few subcentimeter bilateral renal calculi.  Grossly stable subcentimeter left pelvic calcification most likely reflects a phlebolith.  Nonspecific bowel gas pattern.  Visualized osseous and surrounding soft tissue structures appear grossly unchanged.  Impression: As above.    Point of Service: [facility]    Electronically signed by: Lee Mckeon  Date:    09/16/2022  Time:    11:35     Procedures   Assessment and Plan (including Health Maintenance)      Problem List  Smart Sets  Document Outside HM   :    Plan:           Problem List Items Addressed This Visit          ENT    Acute non-recurrent pansinusitis    Current Assessment & Plan     Lincocin and Depomedrol given IM in clinic.   Doxycycline as ordered and Ninjacof as needed    Reviewed pathology of current symptoms, medication side effects/risk/benefits/directions on taking medications, and worsening or persistent symptoms that require follow up in next 2-3 days. Saline/steroid nasal sprays, antihistamine use, increase fluid intake, and multivitamin/elderberry/Zinc use were recommended. May take Tylenol or Motrin as needed for pain and/or fever. Patient was instructed to take antibiotic as directed, complete entire course to avoid antibiotic resistance, and take OTC probiotic with antibiotic to prevent GI upset. Patient verbalized understanding of treatment plan and denies any questions.         Relevant Medications    doxycycline (MONODOX) 100 MG capsule       Pulmonary    Bronchitis - Primary    Current Assessment & Plan     Lincocin and Depomedrol given IM in clinic.   Doxycycline as ordered and Ninjacof as needed.   Albuterol nebs q 4 hours as needed.     Pathology  of current symptoms, medication side effects/risk/benefits/directions on taking medications were reviewed. Patient was educated that the duration of cough associated with bronchitis can last anywhere from 3-5 weeks, etiology of symptoms is likely viral but antibiotic therapy will eliminate possible bacterial cause, and close follow up is needed for discussed worsening symptoms. Instructed patient to take cough syrup as needed. Take antibiotic as directed, complete entire course to avoid antibiotic resistance, and take OTC probiotic with antibiotic to prevent GI upset.           Relevant Medications    pyrilamine-chlophedianoL (NINJACOF) 12.5-12.5 mg/5 mL Liqd    albuterol (PROVENTIL) 2.5 mg /3 mL (0.083 %) nebulizer solution    doxycycline (MONODOX) 100 MG capsule       Health Maintenance Topics with due status: Not Due       Topic Last Completion Date    Cervical Cancer Screening 03/31/2022    Mammogram 04/05/2022    Lipid Panel 09/16/2022       Future Appointments   Date Time Provider Department Center   9/18/2023 10:00 AM ESVIN Funez [facility] MARILU Claros [facility]        Health Maintenance Due   Topic Date Due    TETANUS VACCINE  Never done    Shingles Vaccine (1 of 2) Never done        No follow-ups on file.     Signature:  Suyapa Lott NP  [facility]  [address] [place]  [address]    Date of encounter: 10/13/22

## 2022-10-17 DIAGNOSIS — E78.2 MIXED HYPERLIPIDEMIA: ICD-10-CM

## 2022-10-18 RX ORDER — LOVASTATIN 10 MG/1
TABLET ORAL
Qty: 30 TABLET | Refills: 2 | Status: SHIPPED | OUTPATIENT
Start: 2022-10-18 | End: 2023-01-23 | Stop reason: SDUPTHER

## 2022-10-21 PROBLEM — J01.40 ACUTE NON-RECURRENT PANSINUSITIS: Status: ACTIVE | Noted: 2022-10-21

## 2022-10-21 NOTE — ASSESSMENT & PLAN NOTE
Lincocin and Depomedrol given IM in clinic.   Doxycycline as ordered and Ninjacof as needed.   Albuterol nebs q 4 hours as needed.     Pathology of current symptoms, medication side effects/risk/benefits/directions on taking medications were reviewed. Patient was educated that the duration of cough associated with bronchitis can last anywhere from 3-5 weeks, etiology of symptoms is likely viral but antibiotic therapy will eliminate possible bacterial cause, and close follow up is needed for discussed worsening symptoms. Instructed patient to take cough syrup as needed. Take antibiotic as directed, complete entire course to avoid antibiotic resistance, and take OTC probiotic with antibiotic to prevent GI upset.

## 2022-10-21 NOTE — ASSESSMENT & PLAN NOTE
Lincocin and Depomedrol given IM in clinic.   Doxycycline as ordered and Ninjacof as needed    Reviewed pathology of current symptoms, medication side effects/risk/benefits/directions on taking medications, and worsening or persistent symptoms that require follow up in next 2-3 days. Saline/steroid nasal sprays, antihistamine use, increase fluid intake, and multivitamin/elderberry/Zinc use were recommended. May take Tylenol or Motrin as needed for pain and/or fever. Patient was instructed to take antibiotic as directed, complete entire course to avoid antibiotic resistance, and take OTC probiotic with antibiotic to prevent GI upset. Patient verbalized understanding of treatment plan and denies any questions.

## 2022-11-05 ENCOUNTER — HOSPITAL ENCOUNTER (EMERGENCY)
Facility: HOSPITAL | Age: 62
Discharge: ANOTHER HOSPITAL | End: 2022-11-05
Attending: FAMILY MEDICINE
Payer: COMMERCIAL

## 2022-11-05 ENCOUNTER — HOSPITAL ENCOUNTER (OUTPATIENT)
Facility: HOSPITAL | Age: 62
Discharge: HOME | End: 2022-11-08
Attending: HOSPITALIST | Admitting: HOSPITALIST
Payer: COMMERCIAL

## 2022-11-05 VITALS
OXYGEN SATURATION: 97 % | DIASTOLIC BLOOD PRESSURE: 80 MMHG | HEART RATE: 101 BPM | SYSTOLIC BLOOD PRESSURE: 149 MMHG | RESPIRATION RATE: 18 BRPM | HEIGHT: 61 IN | TEMPERATURE: 99 F | BODY MASS INDEX: 27.56 KG/M2 | WEIGHT: 146 LBS

## 2022-11-05 DIAGNOSIS — N13.30 HYDRONEPHROSIS: ICD-10-CM

## 2022-11-05 DIAGNOSIS — E87.6 HYPOKALEMIA: ICD-10-CM

## 2022-11-05 DIAGNOSIS — N13.1 HYDRONEPHROSIS DUE TO OBSTRUCTION OF URETER: ICD-10-CM

## 2022-11-05 DIAGNOSIS — K21.9 GASTROESOPHAGEAL REFLUX DISEASE, UNSPECIFIED WHETHER ESOPHAGITIS PRESENT: ICD-10-CM

## 2022-11-05 DIAGNOSIS — J01.40 ACUTE NON-RECURRENT PANSINUSITIS: ICD-10-CM

## 2022-11-05 DIAGNOSIS — E78.5 HYPERLIPIDEMIA, UNSPECIFIED HYPERLIPIDEMIA TYPE: ICD-10-CM

## 2022-11-05 DIAGNOSIS — J40 BRONCHITIS: ICD-10-CM

## 2022-11-05 DIAGNOSIS — N39.0 URINARY TRACT INFECTION WITH HEMATURIA, SITE UNSPECIFIED: ICD-10-CM

## 2022-11-05 DIAGNOSIS — N20.0 KIDNEY STONE: Primary | ICD-10-CM

## 2022-11-05 DIAGNOSIS — N13.9 ACUTE UNILATERAL OBSTRUCTIVE UROPATHY: Primary | ICD-10-CM

## 2022-11-05 DIAGNOSIS — N13.9 ACUTE UNILATERAL OBSTRUCTIVE UROPATHY: ICD-10-CM

## 2022-11-05 DIAGNOSIS — N20.0 NEPHROLITHIASIS: ICD-10-CM

## 2022-11-05 DIAGNOSIS — F41.9 ANXIETY: ICD-10-CM

## 2022-11-05 DIAGNOSIS — R31.9 URINARY TRACT INFECTION WITH HEMATURIA, SITE UNSPECIFIED: ICD-10-CM

## 2022-11-05 LAB
ALBUMIN SERPL BCP-MCNC: 2.9 G/DL (ref 3.5–5)
ALBUMIN/GLOB SERPL: 0.8 {RATIO}
ALP SERPL-CCNC: 158 U/L (ref 50–130)
ALT SERPL W P-5'-P-CCNC: 122 U/L (ref 13–56)
ANION GAP SERPL CALCULATED.3IONS-SCNC: 14 MMOL/L (ref 7–16)
AST SERPL W P-5'-P-CCNC: 93 U/L (ref 15–37)
BACTERIA #/AREA URNS HPF: ABNORMAL /HPF
BASOPHILS # BLD AUTO: 0.02 K/UL (ref 0–0.2)
BASOPHILS NFR BLD AUTO: 0.2 % (ref 0–1)
BILIRUB SERPL-MCNC: 1.4 MG/DL (ref ?–1.2)
BILIRUB UR QL STRIP: ABNORMAL
BUN SERPL-MCNC: 16 MG/DL (ref 7–18)
BUN/CREAT SERPL: 13 (ref 6–20)
CALCIUM SERPL-MCNC: 9 MG/DL (ref 8.5–10.1)
CHLORIDE SERPL-SCNC: 104 MMOL/L (ref 98–107)
CLARITY UR: ABNORMAL
CO2 SERPL-SCNC: 24 MMOL/L (ref 21–32)
COLOR UR: ABNORMAL
CREAT SERPL-MCNC: 1.26 MG/DL (ref 0.55–1.02)
DIFFERENTIAL METHOD BLD: ABNORMAL
EGFR (NO RACE VARIABLE) (RUSH/TITUS): 48 ML/MIN/1.73M²
EOSINOPHIL # BLD AUTO: 0 K/UL (ref 0–0.5)
EOSINOPHIL NFR BLD AUTO: 0 % (ref 1–4)
ERYTHROCYTE [DISTWIDTH] IN BLOOD BY AUTOMATED COUNT: 14.3 % (ref 11.5–14.5)
FLUAV AG UPPER RESP QL IA.RAPID: NEGATIVE
FLUBV AG UPPER RESP QL IA.RAPID: NEGATIVE
GLOBULIN SER-MCNC: 3.7 G/DL (ref 2–4)
GLUCOSE SERPL-MCNC: 185 MG/DL (ref 74–106)
GLUCOSE UR STRIP-MCNC: 100 MG/DL
HAV IGM SER QL: NORMAL
HBV CORE IGM SER QL: NORMAL
HBV SURFACE AG SERPL QL IA: NORMAL
HCT VFR BLD AUTO: 35 % (ref 38–47)
HCV AB SER QL: NORMAL
HGB BLD-MCNC: 11.7 G/DL (ref 12–16)
KETONES UR STRIP-SCNC: 15 MG/DL
LEUKOCYTE ESTERASE UR QL STRIP: ABNORMAL
LYMPHOCYTES # BLD AUTO: 0.53 K/UL (ref 1–4.8)
LYMPHOCYTES NFR BLD AUTO: 4 % (ref 27–41)
LYMPHOCYTES NFR BLD MANUAL: 3 % (ref 27–41)
MCH RBC QN AUTO: 29.2 PG (ref 27–31)
MCHC RBC AUTO-ENTMCNC: 33.4 G/DL (ref 32–36)
MCV RBC AUTO: 87.3 FL (ref 80–96)
MONOCYTES # BLD AUTO: 0.85 K/UL (ref 0–0.8)
MONOCYTES NFR BLD AUTO: 6.4 % (ref 2–6)
MONOCYTES NFR BLD MANUAL: 1 % (ref 2–6)
MPC BLD CALC-MCNC: 10 FL (ref 9.4–12.4)
MUCOUS THREADS #/AREA URNS HPF: ABNORMAL /HPF
NEUTROPHILS # BLD AUTO: 11.79 K/UL (ref 1.8–7.7)
NEUTROPHILS NFR BLD AUTO: 89.4 % (ref 53–65)
NEUTS BAND NFR BLD MANUAL: 10 % (ref 1–5)
NEUTS SEG NFR BLD MANUAL: 86 % (ref 50–62)
NITRITE UR QL STRIP: POSITIVE
NRBC BLD MANUAL-RTO: ABNORMAL %
PH UR STRIP: 6.5 PH UNITS
PLATELET # BLD AUTO: 201 K/UL (ref 150–400)
PLATELET MORPHOLOGY: NORMAL
POTASSIUM SERPL-SCNC: 3.4 MMOL/L (ref 3.5–5.1)
PROT SERPL-MCNC: 6.6 G/DL (ref 6.4–8.2)
PROT UR QL STRIP: 100
RBC # BLD AUTO: 4.01 M/UL (ref 4.2–5.4)
RBC # UR STRIP: ABNORMAL /UL
RBC #/AREA URNS HPF: ABNORMAL /HPF
RBC MORPH BLD: NORMAL
SARS-COV+SARS-COV-2 AG RESP QL IA.RAPID: NEGATIVE
SODIUM SERPL-SCNC: 139 MMOL/L (ref 136–145)
SP GR UR STRIP: >=1.03
SQUAMOUS #/AREA URNS LPF: ABNORMAL /LPF
TRANS CELLS #/AREA URNS LPF: ABNORMAL /LPF
UROBILINOGEN UR STRIP-ACNC: >=8 MG/DL
WBC # BLD AUTO: 13.19 K/UL (ref 4.5–11)
WBC #/AREA URNS HPF: ABNORMAL /HPF

## 2022-11-05 PROCEDURE — 96361 HYDRATE IV INFUSION ADD-ON: CPT

## 2022-11-05 PROCEDURE — 99285 EMERGENCY DEPT VISIT HI MDM: CPT | Mod: 25

## 2022-11-05 PROCEDURE — 99283 EMERGENCY DEPT VISIT LOW MDM: CPT | Mod: ,,, | Performed by: FAMILY MEDICINE

## 2022-11-05 PROCEDURE — 87086 URINE CULTURE/COLONY COUNT: CPT | Performed by: FAMILY MEDICINE

## 2022-11-05 PROCEDURE — 36415 COLL VENOUS BLD VENIPUNCTURE: CPT | Performed by: FAMILY MEDICINE

## 2022-11-05 PROCEDURE — G0378 HOSPITAL OBSERVATION PER HR: HCPCS

## 2022-11-05 PROCEDURE — 96365 THER/PROPH/DIAG IV INF INIT: CPT

## 2022-11-05 PROCEDURE — 25000003 PHARM REV CODE 250: Performed by: FAMILY MEDICINE

## 2022-11-05 PROCEDURE — S5010 5% DEXTROSE AND 0.45% SALINE: HCPCS | Performed by: HOSPITALIST

## 2022-11-05 PROCEDURE — 85025 COMPLETE CBC W/AUTO DIFF WBC: CPT | Performed by: FAMILY MEDICINE

## 2022-11-05 PROCEDURE — 80053 COMPREHEN METABOLIC PANEL: CPT | Performed by: FAMILY MEDICINE

## 2022-11-05 PROCEDURE — 80074 ACUTE HEPATITIS PANEL: CPT | Performed by: FAMILY MEDICINE

## 2022-11-05 PROCEDURE — 87077 CULTURE AEROBIC IDENTIFY: CPT | Performed by: FAMILY MEDICINE

## 2022-11-05 PROCEDURE — 63600175 PHARM REV CODE 636 W HCPCS: Performed by: FAMILY MEDICINE

## 2022-11-05 PROCEDURE — 99220 PR INITIAL OBSERVATION CARE,LEVL III: ICD-10-PCS | Mod: ,,, | Performed by: HOSPITALIST

## 2022-11-05 PROCEDURE — 99220 PR INITIAL OBSERVATION CARE,LEVL III: CPT | Mod: ,,, | Performed by: HOSPITALIST

## 2022-11-05 PROCEDURE — G0379 DIRECT REFER HOSPITAL OBSERV: HCPCS

## 2022-11-05 PROCEDURE — 99283 PR EMERGENCY DEPT VISIT,LEVEL III: ICD-10-PCS | Mod: ,,, | Performed by: FAMILY MEDICINE

## 2022-11-05 PROCEDURE — 81001 URINALYSIS AUTO W/SCOPE: CPT | Performed by: FAMILY MEDICINE

## 2022-11-05 PROCEDURE — 81003 URINALYSIS AUTO W/O SCOPE: CPT | Performed by: FAMILY MEDICINE

## 2022-11-05 PROCEDURE — 25000003 PHARM REV CODE 250: Performed by: HOSPITALIST

## 2022-11-05 PROCEDURE — 87428 SARSCOV & INF VIR A&B AG IA: CPT | Performed by: FAMILY MEDICINE

## 2022-11-05 RX ORDER — ACETAMINOPHEN 325 MG/1
650 TABLET ORAL EVERY 8 HOURS PRN
Status: DISCONTINUED | OUTPATIENT
Start: 2022-11-05 | End: 2022-11-08 | Stop reason: HOSPADM

## 2022-11-05 RX ORDER — SODIUM,POTASSIUM PHOSPHATES 280-250MG
2 POWDER IN PACKET (EA) ORAL
Status: DISCONTINUED | OUTPATIENT
Start: 2022-11-05 | End: 2022-11-08 | Stop reason: HOSPADM

## 2022-11-05 RX ORDER — DEXTROSE MONOHYDRATE AND SODIUM CHLORIDE 5; .45 G/100ML; G/100ML
INJECTION, SOLUTION INTRAVENOUS CONTINUOUS
Status: DISCONTINUED | OUTPATIENT
Start: 2022-11-05 | End: 2022-11-08 | Stop reason: HOSPADM

## 2022-11-05 RX ORDER — GLUCAGON 1 MG
1 KIT INJECTION
Status: DISCONTINUED | OUTPATIENT
Start: 2022-11-05 | End: 2022-11-08 | Stop reason: HOSPADM

## 2022-11-05 RX ORDER — LEVOFLOXACIN 5 MG/ML
500 INJECTION, SOLUTION INTRAVENOUS
Status: DISCONTINUED | OUTPATIENT
Start: 2022-11-05 | End: 2022-11-05

## 2022-11-05 RX ORDER — SODIUM CHLORIDE 9 MG/ML
1000 INJECTION, SOLUTION INTRAVENOUS
Status: COMPLETED | OUTPATIENT
Start: 2022-11-05 | End: 2022-11-05

## 2022-11-05 RX ORDER — SODIUM CHLORIDE 0.9 % (FLUSH) 0.9 %
10 SYRINGE (ML) INJECTION
Status: DISCONTINUED | OUTPATIENT
Start: 2022-11-05 | End: 2022-11-08 | Stop reason: HOSPADM

## 2022-11-05 RX ORDER — ACETAMINOPHEN 500 MG
1000 TABLET ORAL EVERY 8 HOURS PRN
Status: DISCONTINUED | OUTPATIENT
Start: 2022-11-05 | End: 2022-11-08 | Stop reason: HOSPADM

## 2022-11-05 RX ORDER — MAG HYDROX/ALUMINUM HYD/SIMETH 200-200-20
30 SUSPENSION, ORAL (FINAL DOSE FORM) ORAL 4 TIMES DAILY PRN
Status: DISCONTINUED | OUTPATIENT
Start: 2022-11-05 | End: 2022-11-08 | Stop reason: HOSPADM

## 2022-11-05 RX ORDER — TAMSULOSIN HYDROCHLORIDE 0.4 MG/1
0.4 CAPSULE ORAL DAILY
Status: DISCONTINUED | OUTPATIENT
Start: 2022-11-06 | End: 2022-11-08 | Stop reason: HOSPADM

## 2022-11-05 RX ORDER — LEVOFLOXACIN 5 MG/ML
750 INJECTION, SOLUTION INTRAVENOUS
Status: DISCONTINUED | OUTPATIENT
Start: 2022-11-06 | End: 2022-11-05

## 2022-11-05 RX ORDER — LEVOFLOXACIN 5 MG/ML
500 INJECTION, SOLUTION INTRAVENOUS
Status: DISCONTINUED | OUTPATIENT
Start: 2022-11-05 | End: 2022-11-05 | Stop reason: HOSPADM

## 2022-11-05 RX ORDER — ONDANSETRON 2 MG/ML
4 INJECTION INTRAMUSCULAR; INTRAVENOUS EVERY 8 HOURS PRN
Status: DISCONTINUED | OUTPATIENT
Start: 2022-11-05 | End: 2022-11-07

## 2022-11-05 RX ORDER — LANOLIN ALCOHOL/MO/W.PET/CERES
800 CREAM (GRAM) TOPICAL
Status: DISCONTINUED | OUTPATIENT
Start: 2022-11-05 | End: 2022-11-08 | Stop reason: HOSPADM

## 2022-11-05 RX ORDER — TALC
6 POWDER (GRAM) TOPICAL NIGHTLY PRN
Status: DISCONTINUED | OUTPATIENT
Start: 2022-11-05 | End: 2022-11-08 | Stop reason: HOSPADM

## 2022-11-05 RX ORDER — NALOXONE HCL 0.4 MG/ML
0.02 VIAL (ML) INJECTION
Status: DISCONTINUED | OUTPATIENT
Start: 2022-11-05 | End: 2022-11-08 | Stop reason: HOSPADM

## 2022-11-05 RX ORDER — POLYETHYLENE GLYCOL 3350 17 G/17G
17 POWDER, FOR SOLUTION ORAL DAILY PRN
Status: DISCONTINUED | OUTPATIENT
Start: 2022-11-05 | End: 2022-11-08 | Stop reason: HOSPADM

## 2022-11-05 RX ORDER — ACETAMINOPHEN 325 MG/1
650 TABLET ORAL EVERY 4 HOURS PRN
Status: DISCONTINUED | OUTPATIENT
Start: 2022-11-05 | End: 2022-11-08 | Stop reason: HOSPADM

## 2022-11-05 RX ORDER — ENOXAPARIN SODIUM 100 MG/ML
40 INJECTION SUBCUTANEOUS EVERY 24 HOURS
Status: DISCONTINUED | OUTPATIENT
Start: 2022-11-05 | End: 2022-11-08 | Stop reason: HOSPADM

## 2022-11-05 RX ORDER — LEVOFLOXACIN 5 MG/ML
500 INJECTION, SOLUTION INTRAVENOUS
Status: DISCONTINUED | OUTPATIENT
Start: 2022-11-06 | End: 2022-11-08 | Stop reason: HOSPADM

## 2022-11-05 RX ORDER — LEVOFLOXACIN 5 MG/ML
500 INJECTION, SOLUTION INTRAVENOUS
Status: DISCONTINUED | OUTPATIENT
Start: 2022-11-06 | End: 2022-11-05

## 2022-11-05 RX ORDER — KETOROLAC TROMETHAMINE 10 MG/1
10 TABLET, FILM COATED ORAL EVERY 6 HOURS PRN
Status: DISCONTINUED | OUTPATIENT
Start: 2022-11-05 | End: 2022-11-08 | Stop reason: HOSPADM

## 2022-11-05 RX ADMIN — SODIUM CHLORIDE 1000 ML: 9 INJECTION, SOLUTION INTRAVENOUS at 09:11

## 2022-11-05 RX ADMIN — KETOROLAC TROMETHAMINE 10 MG: 10 TABLET, FILM COATED ORAL at 07:11

## 2022-11-05 RX ADMIN — SODIUM CHLORIDE 1000 ML: 9 INJECTION, SOLUTION INTRAVENOUS at 06:11

## 2022-11-05 RX ADMIN — POTASSIUM BICARBONATE 25 MEQ: 977.5 TABLET, EFFERVESCENT ORAL at 06:11

## 2022-11-05 RX ADMIN — DEXTROSE AND SODIUM CHLORIDE: 5; 450 INJECTION, SOLUTION INTRAVENOUS at 07:11

## 2022-11-05 RX ADMIN — LEVOFLOXACIN 500 MG: 5 INJECTION, SOLUTION INTRAVENOUS at 10:11

## 2022-11-05 NOTE — ED PROVIDER NOTES
Encounter Date: 2022       History     Chief Complaint   Patient presents with    Flank Pain    Dysuria     PT HERE WITH BODY ACHES, BACK PAIN AND LEFT LOWER QUADRANT PAIN. STATES SYMPTOMS STARTED THURSDAY.  REPORTS HX OF RENAL STONES. LAST HAD ISSUES September. HX OF STONES SINCE THE AGE OF 13. REPORTS HAS VICKIE WITH URINATION.    The history is provided by the patient.   Review of patient's allergies indicates:   Allergen Reactions    Hydrocodone Nausea And Vomiting    Penicillins Nausea Only     Past Medical History:   Diagnosis Date    Anxiety     Hyperlipidemia     Kidney stones      Past Surgical History:   Procedure Laterality Date    ADENOIDECTOMY      APPENDECTOMY       SECTION      CHOLECYSTECTOMY      ECTOPIC PREGNANCY SURGERY      TONSILLECTOMY       Family History   Problem Relation Age of Onset    Thyroid disease Mother     Stroke Father     No Known Problems Sister     No Known Problems Daughter     Ulcerative colitis Son     No Known Problems Maternal Grandmother     No Known Problems Maternal Grandfather     Diabetes Paternal Grandmother     No Known Problems Paternal Grandfather      Social History     Tobacco Use    Smoking status: Never     Passive exposure: Never    Smokeless tobacco: Never   Substance Use Topics    Alcohol use: Never    Drug use: Never     Review of Systems   Constitutional:  Positive for activity change, appetite change, chills and fatigue.   Gastrointestinal:  Positive for nausea.   Genitourinary:  Positive for flank pain and hematuria. Negative for decreased urine volume.   Musculoskeletal:  Positive for back pain.     Physical Exam     Initial Vitals [22 0620]   BP Pulse Resp Temp SpO2   (!) 126/100 (!) 117 18 98.6 °F (37 °C) 95 %      MAP       --         Physical Exam    Constitutional: She appears well-developed and well-nourished.   HENT:   Head: Normocephalic and atraumatic.   Eyes: EOM are normal. Pupils are equal, round, and reactive to light.    Neck: Neck supple.   Normal range of motion.  Cardiovascular:  Normal rate and regular rhythm.           Pulmonary/Chest: Breath sounds normal.   Abdominal: Abdomen is soft. There is abdominal tenderness.   Musculoskeletal:         General: Normal range of motion.      Cervical back: Normal range of motion and neck supple.     Neurological: She is alert and oriented to person, place, and time.   Skin: Skin is warm.   Psychiatric: She has a normal mood and affect. Her behavior is normal. Judgment and thought content normal.       Medical Screening Exam   See Full Note    ED Course   Procedures  Labs Reviewed   COMPREHENSIVE METABOLIC PANEL - Abnormal; Notable for the following components:       Result Value    Potassium 3.4 (*)     Glucose 185 (*)     Creatinine 1.26 (*)     Albumin 2.9 (*)     Bilirubin, Total 1.4 (*)     Alk Phos 158 (*)      (*)     AST 93 (*)     eGFR 48 (*)     All other components within normal limits   URINALYSIS, REFLEX TO URINE CULTURE - Abnormal; Notable for the following components:    Color, UA Brown (*)     Leukocytes, UA Moderate (*)     Nitrites, UA Positive (*)     Protein,  (*)     Glucose,  (*)     Ketones, UA 15 (*)     Urobilinogen, UA >=8.0 (*)     Bilirubin, UA Moderate (*)     Blood, UA Small (*)     Specific Gravity, UA >=1.030 (*)     All other components within normal limits   CBC WITH DIFFERENTIAL - Abnormal; Notable for the following components:    WBC 13.19 (*)     RBC 4.01 (*)     Hemoglobin 11.7 (*)     Hematocrit 35.0 (*)     Neutrophils % 89.4 (*)     Lymphocytes % 4.0 (*)     Neutrophils, Abs 11.79 (*)     Lymphocytes, Absolute 0.53 (*)     Monocytes % 6.4 (*)     Eosinophils % 0.0 (*)     Monocytes, Absolute 0.85 (*)     All other components within normal limits   MANUAL DIFFERENTIAL - Abnormal; Notable for the following components:    Segmented Neutrophils, Man % 86 (*)     Bands, Man % 10 (*)     Lymphocytes, Man % 3 (*)     Monocytes, Man % 1  (*)     All other components within normal limits   URINALYSIS, MICROSCOPIC - Abnormal; Notable for the following components:    WBC, UA Too Numerous To Count (*)     Bacteria, UA Moderate (*)     Squamous Epithelial Cells, UA Occasional (*)     Transitional Epithelial Cells, UA Occasional (*)     Mucus, UA Loaded (*)     All other components within normal limits   SARS-COV2 (COVID) W/ FLU ANTIGEN - Normal    Narrative:     Negative SARS-CoV results should not be used as the sole basis for treatment or patient management decisions; negative results should be considered in the context of a patient's recent exposures, history and the presene of clinical signs and symptoms consistent with COVID-19.  Negative results should be treated as presumptive and confirmed by molecular assay, if necessary for patient management.   CULTURE, URINE   CBC W/ AUTO DIFFERENTIAL    Narrative:     The following orders were created for panel order CBC auto differential.  Procedure                               Abnormality         Status                     ---------                               -----------         ------                     CBC with Differential[[id number]]        Abnormal            Final result               Manual Differential[[id number]]          Abnormal            Final result                 Please view results for these tests on the individual orders.   HEPATITIS PANEL, ACUTE          Imaging Results              CT Renal Stone Study ABD Pelvis WO (Final result)  Result time 11/05/22 09:59:08      Final result by Micah Fowler II, MD (11/05/22 09:59:08)                   Impression:      7.6 mm calculus left proximal ureter with moderate left hydronephrosis.      Electronically signed by: Micah Fowler  Date:    11/05/2022  Time:    09:59               Narrative:    EXAMINATION:  CT RENAL STONE STUDY ABD PELVIS WO    CLINICAL HISTORY:  Nephrolithiasis, uncomplicated;    TECHNIQUE:  Axial CT imaging of the  abdomen and pelvis is performed without contrast.    CT dose reduction technique used - Dose Rite and tube current modulation.    COMPARISON:  None available    FINDINGS:  Cardiac and lung bases are within normal limits    CT abdomen: The liver, spleen, pancreas and adrenal glands are normal in size and density.  No evidence of focal lesion is demonstrated in these solid organs.    There is moderate left hydronephrosis with calculus in the left proximal ureter that measures up to 7.6 mm in width.  Other nonobstructing calculi present in both kidneys.  Small amount of left perinephric stranding present.    The bowel caliber is normal and no wall thickening or adjacent inflammatory change is seen.  No evidence of free fluid or free air is present.  Appendix is normal.    CT pelvis: The bowel and bladder appear within normal limits.  The pelvic organs show no evidence of abnormality                                       X-Ray KUB (Final result)  Result time 11/05/22 08:07:36      Final result by Hoang Barber MD (11/05/22 08:07:36)                   Impression:      No significant change    Bilateral nephrolithiasis as before      Electronically signed by: Hoang Barber  Date:    11/05/2022  Time:    08:07               Narrative:    EXAMINATION:  XR KUB    CLINICAL HISTORY:  flank pain;.    COMPARISON:  September 16, 2022    TECHNIQUE:  AP supine abdomen    FINDINGS:  There is bilateral nephrolithiasis without change.  No definite radiopaque ureteral stone is seen.  Phleboliths overlie the left hemipelvis.    Bowel gas pattern is nonobstructive.  Surgical clips overlie the gallbladder fossa.    No acute osseous abnormality                                       Medications   levoFLOXacin 500 mg/100 mL IVPB 500 mg (0 mg Intravenous Stopped 11/5/22 1153)   sodium chloride 0.9% bolus 1,000 mL (0 mLs Intravenous Stopped 11/5/22 0805)   0.9%  NaCl infusion (0 mLs Intravenous Stopped 11/5/22 1059)                        Clinical Impression:   Final diagnoses:  [N20.0] Kidney stone (Primary)  [N39.0, R31.9] Urinary tract infection with hematuria, site unspecified  [N13.1] Hydronephrosis due to obstruction of ureter      ED Disposition Condition    Transfer to Another Facility Stable                Noris Wells MD  11/05/22 1230       Noris Wells MD  11/05/22 0306

## 2022-11-05 NOTE — H&P
[facility]  [facility] Medicine  History & Physical    Patient Name: Ami Flores  MRN: [id number]  Patient Class: OP- Observation  Admission Date: 2022  Attending Physician: Anca Greco MD   Primary Care Provider: ESVIN Ocasio         Patient information was obtained from patient, past medical records and ER records.     Subjective:     Principal Problem:Acute unilateral obstructive uropathy    Chief Complaint:   Chief Complaint   Patient presents with    Back Pain        HPI: 62yo woman history of nephrolithiasis, recurrent UTIs, GERD, HLD who presents with worsening back pain.  She states that the pain started three days prior to admission and has been progressively worse.  It is a dull pain that is on the left side.  Lying on her back makes it better.  Other exacerbating factors are unclear.  When the pain occurs, it is 4/10 but it is nagging.    She states she has had kidney stones since she was 13 years old. She has not seen a urologist recently.  Aside from the back pain, she has no other complaints.     She presented to the [place] ER department this morning.  After nephrolithiasis was found, she was transferred to [facility] for evaluation by urology.  Once at [facility], there was difficulty entering the patient into the electronic medical record.  She was started on a regular diet. She states she can only have toradol for pain medication.        Past Medical History:   Diagnosis Date    Anxiety     Hyperlipidemia     Kidney stones     Nephrolithiasis     since she was 13 years.  She has always passed the stones.  No procedures ever required.       Past Surgical History:   Procedure Laterality Date    ADENOIDECTOMY      APPENDECTOMY       SECTION      CHOLECYSTECTOMY      ECTOPIC PREGNANCY SURGERY      TONSILLECTOMY         Review of patient's allergies indicates:   Allergen Reactions    Hydrocodone Nausea And Vomiting    Penicillins Nausea Only        Current Facility-Administered Medications on File Prior to Encounter   Medication    [COMPLETED] 0.9%  NaCl infusion    [COMPLETED] sodium chloride 0.9% bolus 1,000 mL    [DISCONTINUED] levoFLOXacin 500 mg/100 mL IVPB 500 mg    [DISCONTINUED] levoFLOXacin 500 mg/100 mL IVPB 500 mg     Current Outpatient Medications on File Prior to Encounter   Medication Sig    albuterol (PROVENTIL) 2.5 mg /3 mL (0.083 %) nebulizer solution Take 3 mLs (2.5 mg total) by nebulization 3 (three) times daily. Rescue    clorazepate (TRANXENE) 3.75 MG Tab Take 1 tablet (3.75 mg total) by mouth 2 (two) times daily. (Patient taking differently: Take 3.75 mg by mouth 2 (two) times daily as needed.)    doxycycline (MONODOX) 100 MG capsule Take 1 capsule (100 mg total) by mouth every 12 (twelve) hours.    ergocalciferol (ERGOCALCIFEROL) 50,000 unit Cap Take 1 capsule (50,000 Units total) by mouth every 7 days.    lansoprazole (PREVACID) 30 MG capsule TAKE ONE CAPSULE BY MOUTH EVERY DAY BEFORE a meal    lovastatin (MEVACOR) 10 MG tablet TAKE ONE TABLET BY MOUTH EVERY EVENING WITH meal    naproxen (NAPROSYN) 500 MG tablet Take 500 mg by mouth 2 (two) times daily as needed.    ondansetron (ZOFRAN) 4 MG tablet Take 1 tablet (4 mg total) by mouth every 8 (eight) hours as needed for Nausea.    pyrilamine-chlophedianoL (NINJACOF) 12.5-12.5 mg/5 mL Liqd Take 10 mLs by mouth every 8 (eight) hours as needed (cough).    tamsulosin (FLOMAX) 0.4 mg Cap Take 1 capsule (0.4 mg total) by mouth once daily.     Family History       Problem Relation (Age of Onset)    Diabetes Paternal Grandmother    No Known Problems Sister, Daughter, Maternal Grandmother, Maternal Grandfather, Paternal Grandfather    Stroke Father    Thyroid disease Mother    Ulcerative colitis Son          Tobacco Use    Smoking status: Never     Passive exposure: Never    Smokeless tobacco: Never   Substance and Sexual Activity    Alcohol use: Never    Drug use: Never     Sexual activity: Yes     Partners: Male     Birth control/protection: Post-menopausal     Review of Systems   Constitutional:  Negative for activity change, chills, fatigue and fever.   HENT:  Negative for congestion and sore throat.    Respiratory:  Negative for chest tightness.    Gastrointestinal:  Positive for nausea. Negative for abdominal distention, abdominal pain and diarrhea.   Endocrine: Negative for cold intolerance and heat intolerance.   Genitourinary:  Negative for dysuria.   Musculoskeletal:  Negative for arthralgias and back pain.   Skin:  Negative for color change and rash.   Neurological:  Positive for headaches. Negative for dizziness and tremors.   Psychiatric/Behavioral:  Negative for agitation. The patient is not nervous/anxious.    Objective:     Vital Signs (Most Recent):  Temp: 100.2 °F (37.9 °C) (11/05/22 1410)  Pulse: 110 (11/05/22 1410)  Resp: 18 (11/05/22 1410)  BP: 128/65 (11/05/22 1410)  SpO2: 95 % (11/05/22 1410) Vital Signs (24h Range):  Temp:  [98.6 °F (37 °C)-100.2 °F (37.9 °C)] 100.2 °F (37.9 °C)  Pulse:  [] 110  Resp:  [18] 18  SpO2:  [95 %-98 %] 95 %  BP: (112-149)/() 128/65        There is no height or weight on file to calculate BMI.    Physical Exam  Constitutional:       Appearance: Normal appearance.   HENT:      Head: Normocephalic and atraumatic.      Nose: Nose normal.      Mouth/Throat:      Mouth: Mucous membranes are moist.      Pharynx: Oropharynx is clear.   Eyes:      Extraocular Movements: Extraocular movements intact.      Conjunctiva/sclera: Conjunctivae normal.      Pupils: Pupils are equal, round, and reactive to light.   Cardiovascular:      Rate and Rhythm: Normal rate and regular rhythm.      Pulses: Normal pulses.      Heart sounds: Normal heart sounds.   Pulmonary:      Effort: Pulmonary effort is normal.      Breath sounds: Normal breath sounds.   Abdominal:      General: Abdomen is flat. Bowel sounds are normal.      Palpations: Abdomen  is soft.      Tenderness: There is abdominal tenderness.      Comments: Left flank tenderness     Musculoskeletal:         General: Normal range of motion.      Cervical back: Normal range of motion and neck supple.   Skin:     General: Skin is warm and dry.   Neurological:      General: No focal deficit present.      Mental Status: She is alert and oriented to person, place, and time.   Psychiatric:         Mood and Affect: Mood normal.         Behavior: Behavior normal.         CRANIAL NERVES     CN III, IV, VI   Pupils are equal, round, and reactive to light.     Significant Labs: All pertinent labs within the past 24 hours have been reviewed.    Significant Imaging: I have reviewed all pertinent imaging results/findings within the past 24 hours.    Assessment/Plan:     * Left obstructive uropathy due to Nephrolithiasis  Long history of nephrolithiasis (since age 13)  Recurrent UTIs  7.8mm stone on CT scan with hydronephrosis    Levofloxacin 500mg daily.   Consult urology Dr. Brooks   IV fluids - 125cc/hr   Pain management with toradol     Antibiotics (From admission, onward)    Start     Stop Route Frequency Ordered    11/06/22 0900  levoFLOXacin 500 mg/100 mL IVPB 500 mg         -- IV Every 24 hours (non-standard times) 11/05/22 1749            Results for orders placed or performed during the hospital encounter of 11/05/22 (from the past 2160 hour(s))   CT Renal Stone Study ABD Pelvis WO    Impression    7.6 mm calculus left proximal ureter with moderate left hydronephrosis.      Electronically signed by: Micah Fowler  Date:    11/05/2022  Time:    09:59           Hypokalemia  K 3.4.  Supplemental potassium ordered.         Hyperlipidemia  Continue home statin       GERD (gastroesophageal reflux disease)  Continue home PPI.       Bronchitis  Recent history of bronchitis and was on inhalers and steroids.    Symptoms have improved.        Anxiety  Tranxene as needed. Continue.          VTE Risk Mitigation  (From admission, onward)         Ordered     enoxaparin injection 40 mg  Daily         11/05/22 1738     Place sequential compression device  Until discontinued         11/05/22 1738     IP VTE HIGH RISK PATIENT  Once         11/05/22 1738                   Anca Greco MD  Department of Hospital Medicine   [facility]

## 2022-11-05 NOTE — HPI
62yo woman history of nephrolithiasis, recurrent UTIs, GERD, HLD who presents with worsening back pain.  She states that the pain started three days prior to admission and has been progressively worse.  It is a dull pain that is on the left side.  Lying on her back makes it better.  Other exacerbating factors are unclear.  When the pain occurs, it is 4/10 but it is nagging.    She states she has had kidney stones since she was 13 years old. She has not seen a urologist recently.  Aside from the back pain, she has no other complaints.     She presented to the [place] ER department this morning.  After nephrolithiasis was found, she was transferred to [facility] for evaluation by urology.  Once at [facility], there was difficulty entering the patient into the electronic medical record.  She was started on a regular diet. She states she can only have toradol for pain medication.

## 2022-11-05 NOTE — SUBJECTIVE & OBJECTIVE
Past Medical History:   Diagnosis Date    Anxiety     Hyperlipidemia     Kidney stones     Nephrolithiasis     since she was 13 years.  She has always passed the stones.  No procedures ever required.       Past Surgical History:   Procedure Laterality Date    ADENOIDECTOMY      APPENDECTOMY       SECTION      CHOLECYSTECTOMY      ECTOPIC PREGNANCY SURGERY      TONSILLECTOMY         Review of patient's allergies indicates:   Allergen Reactions    Hydrocodone Nausea And Vomiting    Penicillins Nausea Only       Current Facility-Administered Medications on File Prior to Encounter   Medication    [COMPLETED] 0.9%  NaCl infusion    [COMPLETED] sodium chloride 0.9% bolus 1,000 mL    [DISCONTINUED] levoFLOXacin 500 mg/100 mL IVPB 500 mg    [DISCONTINUED] levoFLOXacin 500 mg/100 mL IVPB 500 mg     Current Outpatient Medications on File Prior to Encounter   Medication Sig    albuterol (PROVENTIL) 2.5 mg /3 mL (0.083 %) nebulizer solution Take 3 mLs (2.5 mg total) by nebulization 3 (three) times daily. Rescue    clorazepate (TRANXENE) 3.75 MG Tab Take 1 tablet (3.75 mg total) by mouth 2 (two) times daily. (Patient taking differently: Take 3.75 mg by mouth 2 (two) times daily as needed.)    doxycycline (MONODOX) 100 MG capsule Take 1 capsule (100 mg total) by mouth every 12 (twelve) hours.    ergocalciferol (ERGOCALCIFEROL) 50,000 unit Cap Take 1 capsule (50,000 Units total) by mouth every 7 days.    lansoprazole (PREVACID) 30 MG capsule TAKE ONE CAPSULE BY MOUTH EVERY DAY BEFORE a meal    lovastatin (MEVACOR) 10 MG tablet TAKE ONE TABLET BY MOUTH EVERY EVENING WITH meal    naproxen (NAPROSYN) 500 MG tablet Take 500 mg by mouth 2 (two) times daily as needed.    ondansetron (ZOFRAN) 4 MG tablet Take 1 tablet (4 mg total) by mouth every 8 (eight) hours as needed for Nausea.    pyrilamine-chlophedianoL (NINJACOF) 12.5-12.5 mg/5 mL Liqd Take 10 mLs by mouth every 8 (eight) hours as needed (cough).    tamsulosin (FLOMAX)  0.4 mg Cap Take 1 capsule (0.4 mg total) by mouth once daily.     Family History       Problem Relation (Age of Onset)    Diabetes Paternal Grandmother    No Known Problems Sister, Daughter, Maternal Grandmother, Maternal Grandfather, Paternal Grandfather    Stroke Father    Thyroid disease Mother    Ulcerative colitis Son          Tobacco Use    Smoking status: Never     Passive exposure: Never    Smokeless tobacco: Never   Substance and Sexual Activity    Alcohol use: Never    Drug use: Never    Sexual activity: Yes     Partners: Male     Birth control/protection: Post-menopausal     Review of Systems   Constitutional:  Negative for activity change, chills, fatigue and fever.   HENT:  Negative for congestion and sore throat.    Respiratory:  Negative for chest tightness.    Gastrointestinal:  Positive for nausea. Negative for abdominal distention, abdominal pain and diarrhea.   Endocrine: Negative for cold intolerance and heat intolerance.   Genitourinary:  Negative for dysuria.   Musculoskeletal:  Negative for arthralgias and back pain.   Skin:  Negative for color change and rash.   Neurological:  Positive for headaches. Negative for dizziness and tremors.   Psychiatric/Behavioral:  Negative for agitation. The patient is not nervous/anxious.    Objective:     Vital Signs (Most Recent):  Temp: 100.2 °F (37.9 °C) (11/05/22 1410)  Pulse: 110 (11/05/22 1410)  Resp: 18 (11/05/22 1410)  BP: 128/65 (11/05/22 1410)  SpO2: 95 % (11/05/22 1410) Vital Signs (24h Range):  Temp:  [98.6 °F (37 °C)-100.2 °F (37.9 °C)] 100.2 °F (37.9 °C)  Pulse:  [] 110  Resp:  [18] 18  SpO2:  [95 %-98 %] 95 %  BP: (112-149)/() 128/65        There is no height or weight on file to calculate BMI.    Physical Exam  Constitutional:       Appearance: Normal appearance.   HENT:      Head: Normocephalic and atraumatic.      Nose: Nose normal.      Mouth/Throat:      Mouth: Mucous membranes are moist.      Pharynx: Oropharynx is clear.    Eyes:      Extraocular Movements: Extraocular movements intact.      Conjunctiva/sclera: Conjunctivae normal.      Pupils: Pupils are equal, round, and reactive to light.   Cardiovascular:      Rate and Rhythm: Normal rate and regular rhythm.      Pulses: Normal pulses.      Heart sounds: Normal heart sounds.   Pulmonary:      Effort: Pulmonary effort is normal.      Breath sounds: Normal breath sounds.   Abdominal:      General: Abdomen is flat. Bowel sounds are normal.      Palpations: Abdomen is soft.      Tenderness: There is abdominal tenderness.      Comments: Left flank tenderness     Musculoskeletal:         General: Normal range of motion.      Cervical back: Normal range of motion and neck supple.   Skin:     General: Skin is warm and dry.   Neurological:      General: No focal deficit present.      Mental Status: She is alert and oriented to person, place, and time.   Psychiatric:         Mood and Affect: Mood normal.         Behavior: Behavior normal.         CRANIAL NERVES     CN III, IV, VI   Pupils are equal, round, and reactive to light.     Significant Labs: All pertinent labs within the past 24 hours have been reviewed.    Significant Imaging: I have reviewed all pertinent imaging results/findings within the past 24 hours.

## 2022-11-05 NOTE — ED TRIAGE NOTES
Presents to ED with c/o left flank pain, low back pain, burning with urination.  Hx of recent kidney stones bilaterally.  States her urine is brown.  Feels like she may be dehydrated.  C/o body aches, nausea and headache

## 2022-11-05 NOTE — ASSESSMENT & PLAN NOTE
Long history of nephrolithiasis (since age 13)  Recurrent UTIs  7.8mm stone on CT scan with hydronephrosis    Levofloxacin 500mg daily.   Consult urology Dr. Brooks   IV fluids - 125cc/hr   Pain management with toradol     Antibiotics (From admission, onward)    Start     Stop Route Frequency Ordered    11/06/22 0900  levoFLOXacin 500 mg/100 mL IVPB 500 mg         -- IV Every 24 hours (non-standard times) 11/05/22 1742            Results for orders placed or performed during the hospital encounter of 11/05/22 (from the past 2160 hour(s))   CT Renal Stone Study ABD Pelvis WO    Impression    7.6 mm calculus left proximal ureter with moderate left hydronephrosis.      Electronically signed by: Micah Fowler  Date:    11/05/2022  Time:    09:59

## 2022-11-06 LAB
ANION GAP SERPL CALCULATED.3IONS-SCNC: 16 MMOL/L (ref 7–16)
BASOPHILS # BLD AUTO: 0.02 K/UL (ref 0–0.2)
BASOPHILS NFR BLD AUTO: 0.2 % (ref 0–1)
BUN SERPL-MCNC: 11 MG/DL (ref 7–18)
BUN/CREAT SERPL: 10 (ref 6–20)
CALCIUM SERPL-MCNC: 7.3 MG/DL (ref 8.5–10.1)
CHLORIDE SERPL-SCNC: 106 MMOL/L (ref 98–107)
CO2 SERPL-SCNC: 20 MMOL/L (ref 21–32)
CREAT SERPL-MCNC: 1.14 MG/DL (ref 0.55–1.02)
DIFFERENTIAL METHOD BLD: ABNORMAL
EGFR (NO RACE VARIABLE) (RUSH/TITUS): 55 ML/MIN/1.73M²
EOSINOPHIL # BLD AUTO: 0 K/UL (ref 0–0.5)
EOSINOPHIL NFR BLD AUTO: 0 % (ref 1–4)
ERYTHROCYTE [DISTWIDTH] IN BLOOD BY AUTOMATED COUNT: 14.2 % (ref 11.5–14.5)
GLUCOSE SERPL-MCNC: 169 MG/DL (ref 74–106)
HCT VFR BLD AUTO: 30.5 % (ref 38–47)
HGB BLD-MCNC: 10 G/DL (ref 12–16)
IMM GRANULOCYTES # BLD AUTO: 0.06 K/UL (ref 0–0.04)
IMM GRANULOCYTES NFR BLD: 0.7 % (ref 0–0.4)
LYMPHOCYTES # BLD AUTO: 0.75 K/UL (ref 1–4.8)
LYMPHOCYTES NFR BLD AUTO: 8.4 % (ref 27–41)
MAGNESIUM SERPL-MCNC: 1.8 MG/DL (ref 1.7–2.3)
MCH RBC QN AUTO: 28.4 PG (ref 27–31)
MCHC RBC AUTO-ENTMCNC: 32.8 G/DL (ref 32–36)
MCV RBC AUTO: 86.6 FL (ref 80–96)
MONOCYTES # BLD AUTO: 0.61 K/UL (ref 0–0.8)
MONOCYTES NFR BLD AUTO: 6.8 % (ref 2–6)
MPC BLD CALC-MCNC: 10.3 FL (ref 9.4–12.4)
NEUTROPHILS # BLD AUTO: 7.49 K/UL (ref 1.8–7.7)
NEUTROPHILS NFR BLD AUTO: 83.9 % (ref 53–65)
NRBC # BLD AUTO: 0 X10E3/UL
NRBC, AUTO (.00): 0 %
PLATELET # BLD AUTO: 187 K/UL (ref 150–400)
POTASSIUM SERPL-SCNC: 3.6 MMOL/L (ref 3.5–5.1)
RBC # BLD AUTO: 3.52 M/UL (ref 4.2–5.4)
SODIUM SERPL-SCNC: 138 MMOL/L (ref 136–145)
WBC # BLD AUTO: 8.93 K/UL (ref 4.5–11)

## 2022-11-06 PROCEDURE — 25000003 PHARM REV CODE 250: Performed by: HOSPITALIST

## 2022-11-06 PROCEDURE — 99226 PR SUBSEQUENT OBSERVATION CARE,LEVEL III: ICD-10-PCS | Mod: ,,, | Performed by: HOSPITALIST

## 2022-11-06 PROCEDURE — G0378 HOSPITAL OBSERVATION PER HR: HCPCS

## 2022-11-06 PROCEDURE — 63600175 PHARM REV CODE 636 W HCPCS: Performed by: HOSPITALIST

## 2022-11-06 PROCEDURE — 85025 COMPLETE CBC W/AUTO DIFF WBC: CPT | Performed by: HOSPITALIST

## 2022-11-06 PROCEDURE — 36415 COLL VENOUS BLD VENIPUNCTURE: CPT | Performed by: HOSPITALIST

## 2022-11-06 PROCEDURE — 83735 ASSAY OF MAGNESIUM: CPT | Performed by: HOSPITALIST

## 2022-11-06 PROCEDURE — 80048 BASIC METABOLIC PNL TOTAL CA: CPT | Performed by: HOSPITALIST

## 2022-11-06 PROCEDURE — 96365 THER/PROPH/DIAG IV INF INIT: CPT

## 2022-11-06 PROCEDURE — S5010 5% DEXTROSE AND 0.45% SALINE: HCPCS | Performed by: HOSPITALIST

## 2022-11-06 PROCEDURE — 99226 PR SUBSEQUENT OBSERVATION CARE,LEVEL III: CPT | Mod: ,,, | Performed by: HOSPITALIST

## 2022-11-06 RX ORDER — CETIRIZINE HYDROCHLORIDE 10 MG/1
10 TABLET ORAL NIGHTLY
Status: DISCONTINUED | OUTPATIENT
Start: 2022-11-06 | End: 2022-11-08 | Stop reason: HOSPADM

## 2022-11-06 RX ADMIN — DEXTROSE AND SODIUM CHLORIDE: 5; 450 INJECTION, SOLUTION INTRAVENOUS at 03:11

## 2022-11-06 RX ADMIN — DEXTROSE AND SODIUM CHLORIDE: 5; 450 INJECTION, SOLUTION INTRAVENOUS at 06:11

## 2022-11-06 RX ADMIN — ACETAMINOPHEN 1000 MG: 500 TABLET ORAL at 12:11

## 2022-11-06 RX ADMIN — POTASSIUM BICARBONATE 25 MEQ: 977.5 TABLET, EFFERVESCENT ORAL at 09:11

## 2022-11-06 RX ADMIN — LEVOFLOXACIN 500 MG: 5 INJECTION, SOLUTION INTRAVENOUS at 09:11

## 2022-11-06 RX ADMIN — ACETAMINOPHEN 1000 MG: 500 TABLET ORAL at 03:11

## 2022-11-06 RX ADMIN — TAMSULOSIN HYDROCHLORIDE 0.4 MG: 0.4 CAPSULE ORAL at 09:11

## 2022-11-06 RX ADMIN — CETIRIZINE HYDROCHLORIDE 10 MG: 10 TABLET, FILM COATED ORAL at 01:11

## 2022-11-06 NOTE — SUBJECTIVE & OBJECTIVE
Interval History:     Review of Systems   Constitutional:  Negative for activity change, chills, fatigue and fever.   HENT:  Negative for congestion and sore throat.    Respiratory:  Negative for chest tightness.    Gastrointestinal:  Positive for nausea. Negative for abdominal distention, abdominal pain and diarrhea.   Endocrine: Negative for cold intolerance and heat intolerance.   Genitourinary:  Negative for dysuria.   Musculoskeletal:  Negative for arthralgias and back pain.   Skin:  Negative for color change and rash.   Neurological:  Positive for headaches. Negative for dizziness and tremors.   Psychiatric/Behavioral:  Negative for agitation. The patient is not nervous/anxious.    Objective:     Vital Signs (Most Recent):  Temp: 98.1 °F (36.7 °C) (11/06/22 1235)  Pulse: 84 (11/06/22 1235)  Resp: 18 (11/06/22 1235)  BP: 130/64 (11/06/22 1235)  SpO2: 96 % (11/06/22 1235) Vital Signs (24h Range):  Temp:  [98.1 °F (36.7 °C)-100.2 °F (37.9 °C)] 98.1 °F (36.7 °C)  Pulse:  [] 84  Resp:  [16-20] 18  SpO2:  [95 %-98 %] 96 %  BP: (128-155)/(64-85) 130/64     Weight: 69.3 kg (152 lb 11.4 oz)  Body mass index is 28.85 kg/m².  No intake or output data in the 24 hours ending 11/06/22 1252   Physical Exam  Constitutional:       Appearance: Normal appearance.   HENT:      Head: Normocephalic and atraumatic.      Nose: Nose normal.      Mouth/Throat:      Mouth: Mucous membranes are moist.      Pharynx: Oropharynx is clear.   Eyes:      Extraocular Movements: Extraocular movements intact.      Conjunctiva/sclera: Conjunctivae normal.      Pupils: Pupils are equal, round, and reactive to light.   Cardiovascular:      Rate and Rhythm: Normal rate and regular rhythm.      Pulses: Normal pulses.      Heart sounds: Normal heart sounds.   Pulmonary:      Effort: Pulmonary effort is normal.      Breath sounds: Normal breath sounds.   Abdominal:      General: Abdomen is flat. Bowel sounds are normal.      Palpations: Abdomen is  soft.      Tenderness: There is abdominal tenderness.      Comments: Left flank tenderness     Musculoskeletal:         General: Normal range of motion.      Cervical back: Normal range of motion and neck supple.   Skin:     General: Skin is warm and dry.   Neurological:      General: No focal deficit present.      Mental Status: She is alert and oriented to person, place, and time.   Psychiatric:         Mood and Affect: Mood normal.         Behavior: Behavior normal.       Significant Labs: All pertinent labs within the past 24 hours have been reviewed.    Significant Imaging: I have reviewed all pertinent imaging results/findings within the past 24 hours.

## 2022-11-06 NOTE — PLAN OF CARE
Problem: Adult Inpatient Plan of Care  Goal: Plan of Care Review  11/6/2022 0615 by Angie Arce RN  Outcome: Ongoing, Progressing  11/6/2022 0608 by Angie Arce RN  Outcome: Ongoing, Progressing  Goal: Patient-Specific Goal (Individualized)  11/6/2022 0615 by Angie Arce RN  Outcome: Ongoing, Progressing  11/6/2022 0608 by Angie Arce RN  Outcome: Ongoing, Progressing  Goal: Absence of Hospital-Acquired Illness or Injury  11/6/2022 0615 by Angie Arce RN  Outcome: Ongoing, Progressing  11/6/2022 0608 by Angie Arce RN  Outcome: Ongoing, Progressing  Goal: Optimal Comfort and Wellbeing  11/6/2022 0615 by Angie Arce RN  Outcome: Ongoing, Progressing  11/6/2022 0608 by Angie Arce RN  Outcome: Ongoing, Progressing  Goal: Readiness for Transition of Care  11/6/2022 0615 by Angie Arce RN  Outcome: Ongoing, Progressing  11/6/2022 0608 by Angie Arce RN  Outcome: Ongoing, Progressing     Problem: Pain Acute  Goal: Acceptable Pain Control and Functional Ability  11/6/2022 0615 by Angie Arce RN  Outcome: Ongoing, Progressing  11/6/2022 0608 by Angie Arce RN  Outcome: Ongoing, Progressing     Problem: Fall Injury Risk  Goal: Absence of Fall and Fall-Related Injury  Outcome: Ongoing, Progressing

## 2022-11-06 NOTE — PROGRESS NOTES
[facility]  [facility] Medicine  Progress Note    Patient Name: Ami Flores  MRN: [id number]  Patient Class: OP- Observation   Admission Date: 11/5/2022  Length of Stay: 0 days  Attending Physician: Anca Greco MD  Primary Care Provider: ESVIN Ocasio        Subjective:     Principal Problem:Acute unilateral obstructive uropathy    HPI:  62yo woman history of nephrolithiasis, recurrent UTIs, GERD, HLD who presents with worsening back pain.  She states that the pain started three days prior to admission and has been progressively worse.  It is a dull pain that is on the left side.  Lying on her back makes it better.  Other exacerbating factors are unclear.  When the pain occurs, it is 4/10 but it is nagging.    She states she has had kidney stones since she was 13 years old. She has not seen a urologist recently.  Aside from the back pain, she has no other complaints.     She presented to the [place] ER department this morning.  After nephrolithiasis was found, she was transferred to [facility] for evaluation by urology.  Once at [facility], there was difficulty entering the patient into the electronic medical record.  She was started on a regular diet. She states she can only have toradol for pain medication.        Overview/Hospital Course:  11/6:  Plan for stent placement tomorrow.      Interval History:     Review of Systems   Constitutional:  Negative for activity change, chills, fatigue and fever.   HENT:  Negative for congestion and sore throat.    Respiratory:  Negative for chest tightness.    Gastrointestinal:  Positive for nausea. Negative for abdominal distention, abdominal pain and diarrhea.   Endocrine: Negative for cold intolerance and heat intolerance.   Genitourinary:  Negative for dysuria.   Musculoskeletal:  Negative for arthralgias and back pain.   Skin:  Negative for color change and rash.   Neurological:  Positive for headaches. Negative for dizziness and tremors.    Psychiatric/Behavioral:  Negative for agitation. The patient is not nervous/anxious.    Objective:     Vital Signs (Most Recent):  Temp: 98.1 °F (36.7 °C) (11/06/22 1235)  Pulse: 84 (11/06/22 1235)  Resp: 18 (11/06/22 1235)  BP: 130/64 (11/06/22 1235)  SpO2: 96 % (11/06/22 1235) Vital Signs (24h Range):  Temp:  [98.1 °F (36.7 °C)-100.2 °F (37.9 °C)] 98.1 °F (36.7 °C)  Pulse:  [] 84  Resp:  [16-20] 18  SpO2:  [95 %-98 %] 96 %  BP: (128-155)/(64-85) 130/64     Weight: 69.3 kg (152 lb 11.4 oz)  Body mass index is 28.85 kg/m².  No intake or output data in the 24 hours ending 11/06/22 1252   Physical Exam  Constitutional:       Appearance: Normal appearance.   HENT:      Head: Normocephalic and atraumatic.      Nose: Nose normal.      Mouth/Throat:      Mouth: Mucous membranes are moist.      Pharynx: Oropharynx is clear.   Eyes:      Extraocular Movements: Extraocular movements intact.      Conjunctiva/sclera: Conjunctivae normal.      Pupils: Pupils are equal, round, and reactive to light.   Cardiovascular:      Rate and Rhythm: Normal rate and regular rhythm.      Pulses: Normal pulses.      Heart sounds: Normal heart sounds.   Pulmonary:      Effort: Pulmonary effort is normal.      Breath sounds: Normal breath sounds.   Abdominal:      General: Abdomen is flat. Bowel sounds are normal.      Palpations: Abdomen is soft.      Tenderness: There is abdominal tenderness.      Comments: Left flank tenderness     Musculoskeletal:         General: Normal range of motion.      Cervical back: Normal range of motion and neck supple.   Skin:     General: Skin is warm and dry.   Neurological:      General: No focal deficit present.      Mental Status: She is alert and oriented to person, place, and time.   Psychiatric:         Mood and Affect: Mood normal.         Behavior: Behavior normal.       Significant Labs: All pertinent labs within the past 24 hours have been reviewed.    Significant Imaging: I have reviewed all  pertinent imaging results/findings within the past 24 hours.      Assessment/Plan:      * Left obstructive uropathy due to Nephrolithiasis  Long history of nephrolithiasis (since age 13)  Recurrent UTIs  7.8mm stone on CT scan with hydronephrosis    Levofloxacin 500mg daily.   Consult urology Dr. Brooks   IV fluids - 125cc/hr   Pain management with toradol     11/6:  Stent placement tomorrow per Urology.    Antibiotics (From admission, onward)    Start     Stop Route Frequency Ordered    11/06/22 0900  levoFLOXacin 500 mg/100 mL IVPB 500 mg         -- IV Every 24 hours (non-standard times) 11/05/22 1749            Results for orders placed or performed during the hospital encounter of 11/05/22 (from the past 2160 hour(s))   CT Renal Stone Study ABD Pelvis WO    Impression    7.6 mm calculus left proximal ureter with moderate left hydronephrosis.      Electronically signed by: Micah Fowler  Date:    11/05/2022  Time:    09:59           Hypokalemia  K 3.4.  Supplemental potassium ordered.         Hyperlipidemia  Continue home statin       GERD (gastroesophageal reflux disease)  Continue home PPI.       Bronchitis  Recent history of bronchitis and was on inhalers and steroids.    Symptoms have improved.        Anxiety  Tranxene as needed. Continue.          VTE Risk Mitigation (From admission, onward)         Ordered     enoxaparin injection 40 mg  Daily         11/05/22 1738     Place sequential compression device  Until discontinued         11/05/22 1738     IP VTE HIGH RISK PATIENT  Once         11/05/22 1738                Discharge Planning   CHRISTINA:      Code Status: Full Code   Is the patient medically ready for discharge?:     Reason for patient still in hospital (select all that apply): Treatment                     Anca Greco MD  Department of Hospital Medicine   [facility]

## 2022-11-06 NOTE — ASSESSMENT & PLAN NOTE
Long history of nephrolithiasis (since age 13)  Recurrent UTIs  7.8mm stone on CT scan with hydronephrosis    Levofloxacin 500mg daily.   Consult urology Dr. Brooks   IV fluids - 125cc/hr   Pain management with toradol     11/6:  Stent placement tomorrow per Urology.    Antibiotics (From admission, onward)    Start     Stop Route Frequency Ordered    11/06/22 0900  levoFLOXacin 500 mg/100 mL IVPB 500 mg         -- IV Every 24 hours (non-standard times) 11/05/22 0423            Results for orders placed or performed during the hospital encounter of 11/05/22 (from the past 2160 hour(s))   CT Renal Stone Study ABD Pelvis WO    Impression    7.6 mm calculus left proximal ureter with moderate left hydronephrosis.      Electronically signed by: Micah Fowler  Date:    11/05/2022  Time:    09:59

## 2022-11-06 NOTE — HOSPITAL COURSE
11/6:  Plan for stent placement tomorrow.    11/7:  Plan for stent placement today.  Anticipate discharge tomorrow.

## 2022-11-06 NOTE — CONSULTS
Pt with h/o stones & UTIs presents with back pain.  CT shows several non-obstructing stones bilaterally and a 7.6mm proximal left ureteral stone with mild to moderate hydro.  Ucx growing GNR.      Pain is controlled with toradol.  From an infection standpoint, she is improving clinically with levaquin.  Temp & WBC are down.  Cr also trending down.     PE:  Gen: WDWN, NAD, denies pain  HEENT: AT/NC  CV: RRR  Resp: nl effort, no wheeze  Abd: soft  MS: ALVARADO  Neuro: no deficits  Psych: appropriate    Imp:  UTI: continue atbx.  F/u final cx results  Stone: As she is largely asymptomatic, will plan on stent placement tomorrow when the OR team is in house.

## 2022-11-07 ENCOUNTER — ANESTHESIA EVENT (OUTPATIENT)
Dept: SURGERY | Facility: HOSPITAL | Age: 62
End: 2022-11-07
Payer: COMMERCIAL

## 2022-11-07 ENCOUNTER — ANESTHESIA (OUTPATIENT)
Dept: SURGERY | Facility: HOSPITAL | Age: 62
End: 2022-11-07
Payer: COMMERCIAL

## 2022-11-07 LAB
ANION GAP SERPL CALCULATED.3IONS-SCNC: 12 MMOL/L (ref 7–16)
BASOPHILS # BLD AUTO: 0.03 K/UL (ref 0–0.2)
BASOPHILS NFR BLD AUTO: 0.3 % (ref 0–1)
BUN SERPL-MCNC: 10 MG/DL (ref 7–18)
BUN/CREAT SERPL: 8 (ref 6–20)
CALCIUM SERPL-MCNC: 7.8 MG/DL (ref 8.5–10.1)
CHLORIDE SERPL-SCNC: 109 MMOL/L (ref 98–107)
CO2 SERPL-SCNC: 23 MMOL/L (ref 21–32)
CREAT SERPL-MCNC: 1.24 MG/DL (ref 0.55–1.02)
DIFFERENTIAL METHOD BLD: ABNORMAL
EGFR (NO RACE VARIABLE) (RUSH/TITUS): 49 ML/MIN/1.73M²
EOSINOPHIL # BLD AUTO: 0.07 K/UL (ref 0–0.5)
EOSINOPHIL NFR BLD AUTO: 0.8 % (ref 1–4)
ERYTHROCYTE [DISTWIDTH] IN BLOOD BY AUTOMATED COUNT: 14.6 % (ref 11.5–14.5)
GLUCOSE SERPL-MCNC: 123 MG/DL (ref 70–105)
GLUCOSE SERPL-MCNC: 128 MG/DL (ref 74–106)
HCT VFR BLD AUTO: 31.4 % (ref 38–47)
HGB BLD-MCNC: 10.3 G/DL (ref 12–16)
IMM GRANULOCYTES # BLD AUTO: 0.09 K/UL (ref 0–0.04)
IMM GRANULOCYTES NFR BLD: 1 % (ref 0–0.4)
LYMPHOCYTES # BLD AUTO: 1.01 K/UL (ref 1–4.8)
LYMPHOCYTES NFR BLD AUTO: 11.3 % (ref 27–41)
MAGNESIUM SERPL-MCNC: 1.8 MG/DL (ref 1.7–2.3)
MCH RBC QN AUTO: 28.5 PG (ref 27–31)
MCHC RBC AUTO-ENTMCNC: 32.8 G/DL (ref 32–36)
MCV RBC AUTO: 86.7 FL (ref 80–96)
MONOCYTES # BLD AUTO: 0.8 K/UL (ref 0–0.8)
MONOCYTES NFR BLD AUTO: 8.9 % (ref 2–6)
MPC BLD CALC-MCNC: 10.4 FL (ref 9.4–12.4)
NEUTROPHILS # BLD AUTO: 6.97 K/UL (ref 1.8–7.7)
NEUTROPHILS NFR BLD AUTO: 77.7 % (ref 53–65)
NRBC # BLD AUTO: 0 X10E3/UL
NRBC, AUTO (.00): 0 %
PLATELET # BLD AUTO: 189 K/UL (ref 150–400)
POTASSIUM SERPL-SCNC: 3.6 MMOL/L (ref 3.5–5.1)
RBC # BLD AUTO: 3.62 M/UL (ref 4.2–5.4)
SODIUM SERPL-SCNC: 140 MMOL/L (ref 136–145)
UA COMPLETE W REFLEX CULTURE PNL UR: ABNORMAL
WBC # BLD AUTO: 8.97 K/UL (ref 4.5–11)

## 2022-11-07 PROCEDURE — C2617 STENT, NON-COR, TEM W/O DEL: HCPCS | Performed by: UROLOGY

## 2022-11-07 PROCEDURE — 63600175 PHARM REV CODE 636 W HCPCS: Performed by: NURSE ANESTHETIST, CERTIFIED REGISTERED

## 2022-11-07 PROCEDURE — 82962 GLUCOSE BLOOD TEST: CPT

## 2022-11-07 PROCEDURE — 37000008 HC ANESTHESIA 1ST 15 MINUTES: Performed by: UROLOGY

## 2022-11-07 PROCEDURE — G0378 HOSPITAL OBSERVATION PER HR: HCPCS

## 2022-11-07 PROCEDURE — 36415 COLL VENOUS BLD VENIPUNCTURE: CPT | Performed by: HOSPITALIST

## 2022-11-07 PROCEDURE — 36000707: Performed by: UROLOGY

## 2022-11-07 PROCEDURE — C1758 CATHETER, URETERAL: HCPCS | Performed by: UROLOGY

## 2022-11-07 PROCEDURE — C1769 GUIDE WIRE: HCPCS | Performed by: UROLOGY

## 2022-11-07 PROCEDURE — 27000655: Performed by: NURSE ANESTHETIST, CERTIFIED REGISTERED

## 2022-11-07 PROCEDURE — 80048 BASIC METABOLIC PNL TOTAL CA: CPT | Performed by: HOSPITALIST

## 2022-11-07 PROCEDURE — 71000033 HC RECOVERY, INTIAL HOUR: Performed by: UROLOGY

## 2022-11-07 PROCEDURE — 52332 CYSTOSCOPY AND TREATMENT: CPT | Mod: LT,,, | Performed by: UROLOGY

## 2022-11-07 PROCEDURE — D9220A PRA ANESTHESIA: Mod: ANES,,, | Performed by: ANESTHESIOLOGY

## 2022-11-07 PROCEDURE — D9220A PRA ANESTHESIA: ICD-10-PCS | Mod: CRNA,,, | Performed by: NURSE ANESTHETIST, CERTIFIED REGISTERED

## 2022-11-07 PROCEDURE — 63600175 PHARM REV CODE 636 W HCPCS: Performed by: HOSPITALIST

## 2022-11-07 PROCEDURE — D9220A PRA ANESTHESIA: Mod: CRNA,,, | Performed by: NURSE ANESTHETIST, CERTIFIED REGISTERED

## 2022-11-07 PROCEDURE — 85025 COMPLETE CBC W/AUTO DIFF WBC: CPT | Performed by: HOSPITALIST

## 2022-11-07 PROCEDURE — S5010 5% DEXTROSE AND 0.45% SALINE: HCPCS | Performed by: HOSPITALIST

## 2022-11-07 PROCEDURE — 99226 PR SUBSEQUENT OBSERVATION CARE,LEVEL III: CPT | Mod: ,,, | Performed by: HOSPITALIST

## 2022-11-07 PROCEDURE — 25000003 PHARM REV CODE 250: Performed by: HOSPITALIST

## 2022-11-07 PROCEDURE — 27000177 HC AIRWAY, LARYNGEAL MASK: Performed by: NURSE ANESTHETIST, CERTIFIED REGISTERED

## 2022-11-07 PROCEDURE — 52332 PR CYSTOSCOPY,INSERT URETERAL STENT: ICD-10-PCS | Mod: LT,,, | Performed by: UROLOGY

## 2022-11-07 PROCEDURE — 99226 PR SUBSEQUENT OBSERVATION CARE,LEVEL III: ICD-10-PCS | Mod: ,,, | Performed by: HOSPITALIST

## 2022-11-07 PROCEDURE — 36000706: Performed by: UROLOGY

## 2022-11-07 PROCEDURE — D9220A PRA ANESTHESIA: ICD-10-PCS | Mod: ANES,,, | Performed by: ANESTHESIOLOGY

## 2022-11-07 PROCEDURE — 96366 THER/PROPH/DIAG IV INF ADDON: CPT | Mod: 59

## 2022-11-07 PROCEDURE — 25000003 PHARM REV CODE 250: Performed by: NURSE ANESTHETIST, CERTIFIED REGISTERED

## 2022-11-07 PROCEDURE — 27000716 HC OXISENSOR PROBE, ANY SIZE: Performed by: NURSE ANESTHETIST, CERTIFIED REGISTERED

## 2022-11-07 PROCEDURE — 83735 ASSAY OF MAGNESIUM: CPT | Performed by: HOSPITALIST

## 2022-11-07 PROCEDURE — 37000009 HC ANESTHESIA EA ADD 15 MINS: Performed by: UROLOGY

## 2022-11-07 DEVICE — STENT CONTOUR VL 7F 22-30CM: Type: IMPLANTABLE DEVICE | Site: URETER | Status: FUNCTIONAL

## 2022-11-07 RX ORDER — IPRATROPIUM BROMIDE AND ALBUTEROL SULFATE 2.5; .5 MG/3ML; MG/3ML
3 SOLUTION RESPIRATORY (INHALATION) ONCE AS NEEDED
Status: DISCONTINUED | OUTPATIENT
Start: 2022-11-07 | End: 2022-11-07 | Stop reason: HOSPADM

## 2022-11-07 RX ORDER — MEPERIDINE HYDROCHLORIDE 25 MG/ML
25 INJECTION INTRAMUSCULAR; INTRAVENOUS; SUBCUTANEOUS ONCE AS NEEDED
Status: DISCONTINUED | OUTPATIENT
Start: 2022-11-07 | End: 2022-11-07 | Stop reason: HOSPADM

## 2022-11-07 RX ORDER — SCOLOPAMINE TRANSDERMAL SYSTEM 1 MG/1
PATCH, EXTENDED RELEASE TRANSDERMAL
Status: DISCONTINUED | OUTPATIENT
Start: 2022-11-07 | End: 2022-11-07

## 2022-11-07 RX ORDER — PROMETHAZINE HYDROCHLORIDE 25 MG/ML
INJECTION, SOLUTION INTRAMUSCULAR; INTRAVENOUS
Status: DISCONTINUED | OUTPATIENT
Start: 2022-11-07 | End: 2022-11-07

## 2022-11-07 RX ORDER — ONDANSETRON 2 MG/ML
INJECTION INTRAMUSCULAR; INTRAVENOUS
Status: DISCONTINUED | OUTPATIENT
Start: 2022-11-07 | End: 2022-11-07

## 2022-11-07 RX ORDER — GENTAMICIN SULFATE 80 MG/100ML
INJECTION, SOLUTION INTRAVENOUS
Status: DISCONTINUED | OUTPATIENT
Start: 2022-11-07 | End: 2022-11-07

## 2022-11-07 RX ORDER — KETOROLAC TROMETHAMINE 30 MG/ML
INJECTION, SOLUTION INTRAMUSCULAR; INTRAVENOUS
Status: DISCONTINUED | OUTPATIENT
Start: 2022-11-07 | End: 2022-11-07

## 2022-11-07 RX ORDER — PROPOFOL 10 MG/ML
VIAL (ML) INTRAVENOUS
Status: DISCONTINUED | OUTPATIENT
Start: 2022-11-07 | End: 2022-11-07

## 2022-11-07 RX ORDER — DEXAMETHASONE SODIUM PHOSPHATE 100 MG/10ML
INJECTION INTRAMUSCULAR; INTRAVENOUS
Status: DISCONTINUED | OUTPATIENT
Start: 2022-11-07 | End: 2022-11-07

## 2022-11-07 RX ORDER — LIDOCAINE HYDROCHLORIDE 20 MG/ML
INJECTION, SOLUTION EPIDURAL; INFILTRATION; INTRACAUDAL; PERINEURAL
Status: DISCONTINUED | OUTPATIENT
Start: 2022-11-07 | End: 2022-11-07

## 2022-11-07 RX ORDER — SODIUM CHLORIDE, SODIUM LACTATE, POTASSIUM CHLORIDE, CALCIUM CHLORIDE 600; 310; 30; 20 MG/100ML; MG/100ML; MG/100ML; MG/100ML
INJECTION, SOLUTION INTRAVENOUS CONTINUOUS PRN
Status: DISCONTINUED | OUTPATIENT
Start: 2022-11-07 | End: 2022-11-07

## 2022-11-07 RX ORDER — ONDANSETRON 2 MG/ML
4 INJECTION INTRAMUSCULAR; INTRAVENOUS DAILY PRN
Status: DISCONTINUED | OUTPATIENT
Start: 2022-11-07 | End: 2022-11-07 | Stop reason: HOSPADM

## 2022-11-07 RX ORDER — DIPHENHYDRAMINE HYDROCHLORIDE 50 MG/ML
25 INJECTION INTRAMUSCULAR; INTRAVENOUS EVERY 6 HOURS PRN
Status: DISCONTINUED | OUTPATIENT
Start: 2022-11-07 | End: 2022-11-07 | Stop reason: HOSPADM

## 2022-11-07 RX ORDER — MORPHINE SULFATE 10 MG/ML
4 INJECTION INTRAMUSCULAR; INTRAVENOUS; SUBCUTANEOUS EVERY 5 MIN PRN
Status: DISCONTINUED | OUTPATIENT
Start: 2022-11-07 | End: 2022-11-07 | Stop reason: HOSPADM

## 2022-11-07 RX ORDER — HYDROMORPHONE HYDROCHLORIDE 2 MG/ML
0.5 INJECTION, SOLUTION INTRAMUSCULAR; INTRAVENOUS; SUBCUTANEOUS EVERY 5 MIN PRN
Status: DISCONTINUED | OUTPATIENT
Start: 2022-11-07 | End: 2022-11-07 | Stop reason: HOSPADM

## 2022-11-07 RX ADMIN — DEXAMETHASONE SODIUM PHOSPHATE 10 MG: 10 INJECTION INTRAMUSCULAR; INTRAVENOUS at 12:11

## 2022-11-07 RX ADMIN — PROMETHAZINE HYDROCHLORIDE 25 MG: 25 INJECTION INTRAMUSCULAR; INTRAVENOUS at 12:11

## 2022-11-07 RX ADMIN — CETIRIZINE HYDROCHLORIDE 10 MG: 10 TABLET, FILM COATED ORAL at 09:11

## 2022-11-07 RX ADMIN — GENTAMICIN SULFATE 80 MG: 80 INJECTION, SOLUTION INTRAVENOUS at 12:11

## 2022-11-07 RX ADMIN — LIDOCAINE HYDROCHLORIDE 50 MG: 20 INJECTION, SOLUTION EPIDURAL; INFILTRATION; INTRACAUDAL; PERINEURAL at 12:11

## 2022-11-07 RX ADMIN — KETOROLAC TROMETHAMINE 30 MG: 30 INJECTION, SOLUTION INTRAMUSCULAR at 12:11

## 2022-11-07 RX ADMIN — PROPOFOL 200 MG: 10 INJECTION, EMULSION INTRAVENOUS at 12:11

## 2022-11-07 RX ADMIN — TAMSULOSIN HYDROCHLORIDE 0.4 MG: 0.4 CAPSULE ORAL at 09:11

## 2022-11-07 RX ADMIN — ESMOLOL HYDROCHLORIDE 20 MG: 100 INJECTION, SOLUTION INTRAVENOUS at 12:11

## 2022-11-07 RX ADMIN — DEXTROSE AND SODIUM CHLORIDE: 5; 450 INJECTION, SOLUTION INTRAVENOUS at 07:11

## 2022-11-07 RX ADMIN — LEVOFLOXACIN 500 MG: 5 INJECTION, SOLUTION INTRAVENOUS at 09:11

## 2022-11-07 RX ADMIN — POTASSIUM BICARBONATE 25 MEQ: 977.5 TABLET, EFFERVESCENT ORAL at 09:11

## 2022-11-07 RX ADMIN — ACETAMINOPHEN 1000 MG: 500 TABLET ORAL at 01:11

## 2022-11-07 RX ADMIN — ONDANSETRON 4 MG: 2 INJECTION INTRAMUSCULAR; INTRAVENOUS at 12:11

## 2022-11-07 RX ADMIN — SCOPALAMINE 1 PATCH: 1 PATCH, EXTENDED RELEASE TRANSDERMAL at 12:11

## 2022-11-07 RX ADMIN — SODIUM CHLORIDE, POTASSIUM CHLORIDE, SODIUM LACTATE AND CALCIUM CHLORIDE: 600; 310; 30; 20 INJECTION, SOLUTION INTRAVENOUS at 12:11

## 2022-11-07 NOTE — NURSING
Patient back in room post-op. Report received, vitals taken and stable. Provided assistance to restroom and give patient water per her request. No concerns voiced by patient or family member. Will continue to monitor.

## 2022-11-07 NOTE — ANESTHESIA PROCEDURE NOTES
Intubation    Date/Time: 11/7/2022 12:24 PM  Performed by: Kush Palomo CRNA  Authorized by: Oleg Roche     Intubation:     Induction:  Intravenous    Intubated:  Postinduction    Mask Ventilation:  Easy mask    Attempts:  1    Attempted By:  CRNA    Difficult Airway Encountered?: No      Complications:  None    Airway Device:  Supraglottic airway/LMA    Airway Device Size:  4.0    Placement Verified By:  Capnometry    Complicating Factors:  None    Findings Post-Intubation:  BS equal bilateral

## 2022-11-07 NOTE — TRANSFER OF CARE
"Anesthesia Transfer of Care Note    Patient: Ami Flores    Procedure(s) Performed: Procedure(s) (LRB):  CYSTOSCOPY, WITH URETERAL STENT INSERTION (Left)    Patient location: PACU    Anesthesia Type: general    Transport from OR: Transported from OR on 6-10 L/min O2 by face mask with adequate spontaneous ventilation    Post pain: adequate analgesia    Post assessment: no apparent anesthetic complications    Post vital signs: stable    Level of consciousness: awake and alert    Complications: none    Transfer of care protocol was followed      Last vitals:   Visit Vitals  /78   Pulse 104   Temp 36.6 °C (97.9 °F)   Resp 20   Ht 5' 1" (1.549 m)   Wt 69.3 kg (152 lb 12.5 oz)   LMP  (LMP Unknown)   SpO2 96%   BMI 28.87 kg/m²     "

## 2022-11-07 NOTE — OP NOTE
[facility]  General Surgery  Operative Note    SUMMARY     Date of Procedure: 11/7/2022     Procedure: Procedure(s) (LRB):  CYSTOSCOPY, WITH URETERAL STENT INSERTION (Left)       Surgeon(s) and Role:     * Nathen Pierre Jr., MD - Primary    Assistant:  JESSICA THACKER    Pre-Operative Diagnosis: Acute unilateral obstructive uropathy [N13.9] secondary to left ureteral stone    Post-Operative Diagnosis: Post-Op Diagnosis Codes:     * Acute unilateral obstructive uropathy [N13.9] secondary to left ureteral stone    Anesthesia: General LMA    Operative Findings (including complications, if any):     Description of Technical Procedures:  Patient was taken to the hospital cystoscopy suite.  Satisfactory general LMA was administered.  She was placed in the dorsal lithotomy position and prepared for cystoscopic procedures.  The urethra calibrated with Kendrick bougie through 24 Danish without hang up.  The 21 Danish Olympus rigid cystoscope was passed and bladder viewed with lateral and Foroblique lens.  The mucosa had multiple lesions compatible with hemorrhagic cystitis.  Ureteral orifices normal in size shape position.  No other abnormalities were noted.  The ureteral access catheter was passed into the left ureter.  A 0.038 glidewire was passed up the ureter to the stone in the upper ureter.  It did not go past the stone.  The access catheter was slid over the guidewire and used to displace the stone slightly.  We passed access catheter on into the kidney.  Guidewire was removed and confirmed hydronephrotic drip.  Guidewire was passed back into the access catheter and access catheter removed.  A 7 Danish variable length ureteral stent was passed into the left collecting system.  Good curl was noted in the kidney and good curl noted in the bladder after the guidewire was removed.  There was still a large amount of urine draining from the left collecting system through the stent that had a  pyridium discoloration.  Bladder was drained and cystoscope removed.  The patient left for the PACU in satisfactory condition.  There was no blood loss and there were no complications.    Significant Surgical Tasks Conducted by the Assistant(s), if Applicable:     Estimated Blood Loss (EBL): * No values recorded between 11/7/2022 12:35 PM and 11/7/2022 12:50 PM *           Implants: * No implants in log *    Specimens:   Specimen (24h ago, onward)      None                    Condition: Stable    Disposition: PACU - hemodynamically stable.    Attestation: I was present and scrubbed for the entire procedure.

## 2022-11-07 NOTE — ANESTHESIA PREPROCEDURE EVALUATION
11/07/2022  Ami Flores is a 61 y.o., female.      Pre-op Assessment    I have reviewed the Patient Summary Reports.     I have reviewed the Nursing Notes. I have reviewed the NPO Status.   I have reviewed the Medications.     Review of Systems  Anesthesia Hx:  No previous Anesthesia  Denies Family Hx of Anesthesia complications.   Denies Personal Hx of Anesthesia complications.   Cardiovascular:   Exercise tolerance: good hyperlipidemia    Pulmonary:   Shortness of breath H/o bronchitis   Renal/:   Chronic Renal Disease, ARF renal calculi    Hepatic/GI:   GERD    Psych:   Psychiatric History anxiety          Physical Exam  General: Well nourished, Cooperative and Alert    Airway:  Mallampati: II   Mouth Opening: Normal  TM Distance: Normal  Tongue: Normal  Neck ROM: Normal ROM    Dental:  Intact    Chest/Lungs:  Clear to auscultation, Normal Respiratory Rate    Heart:  Rate: Normal  Rhythm: Regular Rhythm        Chemistry        Component Value Date/Time     11/07/2022 0356    K 3.6 11/07/2022 0356     (H) 11/07/2022 0356    CO2 23 11/07/2022 0356    BUN 10 11/07/2022 0356    CREATININE 1.24 (H) 11/07/2022 0356     (H) 11/07/2022 0356        Component Value Date/Time    CALCIUM 7.8 (L) 11/07/2022 0356    ALKPHOS 158 (H) 11/05/2022 0652    AST 93 (H) 11/05/2022 0652     (H) 11/05/2022 0652    BILITOT 1.4 (H) 11/05/2022 0652        Lab Results   Component Value Date    WBC 8.97 11/07/2022    RBC 3.62 (L) 11/07/2022    HGB 10.3 (L) 11/07/2022    MCV 86.7 11/07/2022    MCH 28.5 11/07/2022    MCHC 32.8 11/07/2022    RDW 14.6 (H) 11/07/2022     11/07/2022    MPV 10.4 11/07/2022    LYMPH 11.3 (L) 11/07/2022    LYMPH 1.01 11/07/2022    MONO 8.9 (H) 11/07/2022    EOS 0.07 11/07/2022    BASO 0.03 11/07/2022     No results found for this or any previous visit.      Anesthesia  Plan  Type of Anesthesia, risks & benefits discussed:    Anesthesia Type: Gen Supraglottic Airway  Intra-op Monitoring Plan: Standard ASA Monitors  Post Op Pain Control Plan: multimodal analgesia  Induction:  IV  Airway Plan: Direct, Post-Induction  Informed Consent: Informed consent signed with the Patient and all parties understand the risks and agree with anesthesia plan.  All questions answered.   ASA Score: 2  Day of Surgery Review of History & Physical: H&P Update referred to the surgeon/provider.I have interviewed and examined the patient. I have reviewed the patient's H&P dated: There are no significant changes.     Ready For Surgery From Anesthesia Perspective.     .

## 2022-11-07 NOTE — OR NURSING
1258 REC'D TO PACU IN STABLE COND. PT SLEEPING, WAKES TO VOICE. CONNECTED TO BP CUFF, EKG LEADS & SPO2 MONITORS. VS STABLE. PT HAD A CYSTO WITH A STENT PLACEMENT. NO DISTRESS NOTED @ THIS TIME, WILL CONT TO MONITOR.       1330 TRANSFERRED TO ROOM 467 IN STABLE COND. PT AWAKE & ALERT. VS STABLE. BEDSIDE REPORT GIVEN TO  NAZIA FUCHS. NO DISTRESS NOTED @THIS TIME.

## 2022-11-07 NOTE — PROGRESS NOTES
Pt with h/o stones & UTIs presents with back pain.  CT shows several non-obstructing stones bilaterally and a 7.6mm proximal left ureteral stone with mild to moderate hydro.  Ucx growing GNR.       Pain is controlled with toradol.  From an infection standpoint, she is improving clinically with levaquin.  Temp & WBC are down.  Cr also trending down.      PE:  Gen: WDWN, NAD, denies pain  HEENT: AT/NC  CV: RRR  Resp: nl effort, no wheeze  Abd: soft  MS: ALVARADO  Neuro: no deficits  Psych: appropriate     Imp:  UTI: continue atbx.  F/u final cx results  Stone: As she is largely asymptomatic, will plan on stent placement tomorrow when the OR team is in house.   --------------------------------------------------------------------------------  The above note is the consultation note of Urologist, Dr. Nelia Brooks, who was covering Urology call this past weekend.    Dr. Brooks called me last night about Mrs. Flores. I went by and saw her this morning and told her that Dr. Brooks had spoken to me about her.  On Abd/Pelvis CT Scan, Mrs. Flores has an 8 x 6 mm left UPJ stone with moderated hydronephrosis.  She also has a large stone in the left lower pole and some smaller stones.    She has had stones since the age of 13 but has not had to have any intervention, and she has been able to pass all stones on her own.  She did see a urologist in [place] 1 time who suggested a medication but she did not take because of the large number of pills she would have to take each day.  Patient was treated for urinary tract infection in July.  It appears she still has an infection or another infection at this time.  Since she has a positive UTI (she is growing >100,000 GNR) and the sensitivity is not yet available, I have recommended inserting a left ureteral stent to relieve the hydronephrosis and help with her pain.  We will wait until the UTI has been treated and bring her back to treat the left UPJ stone and will probably go ahead and  treat the stone that's in the lower pole of the left kidney at the same time.    Mrs. Flores is agreeable to the planned procedure and informed consent obtained.

## 2022-11-07 NOTE — ANESTHESIA POSTPROCEDURE EVALUATION
Anesthesia Post Evaluation    Patient: Ami Flores    Procedure(s) Performed: Procedure(s) (LRB):  CYSTOSCOPY, WITH URETERAL STENT INSERTION (Left)    Final Anesthesia Type: general      Patient location during evaluation: PACU  Patient participation: Yes- Able to Participate  Level of consciousness: awake and alert and oriented  Post-procedure vital signs: reviewed and stable  Pain management: adequate  Airway patency: patent  PAMELA mitigation strategies: Multimodal analgesia  PONV status at discharge: No PONV  Anesthetic complications: no      Cardiovascular status: hemodynamically stable  Respiratory status: unassisted and spontaneous ventilation  Hydration status: euvolemic  Follow-up not needed.          Vitals Value Taken Time   /63 11/07/22 1415   Temp 36.1 °C (96.9 °F) 11/07/22 1415   Pulse 91 11/07/22 1415   Resp 18 11/07/22 1415   SpO2 96 % 11/07/22 1415         Event Time   Out of Recovery 11/07/2022 13:30:00         Pain/Bran Score: Pain Rating Prior to Med Admin: 6 (11/7/2022  1:56 AM)  Bran Score: 10 (11/7/2022  1:30 PM)

## 2022-11-08 VITALS
HEIGHT: 61 IN | OXYGEN SATURATION: 95 % | BODY MASS INDEX: 28.84 KG/M2 | TEMPERATURE: 97 F | WEIGHT: 152.75 LBS | SYSTOLIC BLOOD PRESSURE: 133 MMHG | DIASTOLIC BLOOD PRESSURE: 74 MMHG | HEART RATE: 84 BPM | RESPIRATION RATE: 18 BRPM

## 2022-11-08 DIAGNOSIS — N13.9 ACUTE UNILATERAL OBSTRUCTIVE UROPATHY: Primary | ICD-10-CM

## 2022-11-08 DIAGNOSIS — N20.0 KIDNEY STONE: Primary | ICD-10-CM

## 2022-11-08 LAB
ANION GAP SERPL CALCULATED.3IONS-SCNC: 17 MMOL/L (ref 7–16)
BASOPHILS # BLD AUTO: 0.03 K/UL (ref 0–0.2)
BASOPHILS NFR BLD AUTO: 0.2 % (ref 0–1)
BUN SERPL-MCNC: 15 MG/DL (ref 7–18)
BUN/CREAT SERPL: 12 (ref 6–20)
CALCIUM SERPL-MCNC: 8.6 MG/DL (ref 8.5–10.1)
CHLORIDE SERPL-SCNC: 105 MMOL/L (ref 98–107)
CO2 SERPL-SCNC: 22 MMOL/L (ref 21–32)
CREAT SERPL-MCNC: 1.22 MG/DL (ref 0.55–1.02)
DIFFERENTIAL METHOD BLD: ABNORMAL
EGFR (NO RACE VARIABLE) (RUSH/TITUS): 50 ML/MIN/1.73M²
EOSINOPHIL # BLD AUTO: 0 K/UL (ref 0–0.5)
EOSINOPHIL NFR BLD AUTO: 0 % (ref 1–4)
ERYTHROCYTE [DISTWIDTH] IN BLOOD BY AUTOMATED COUNT: 14.8 % (ref 11.5–14.5)
GLUCOSE SERPL-MCNC: 175 MG/DL (ref 74–106)
HCT VFR BLD AUTO: 36 % (ref 38–47)
HGB BLD-MCNC: 11.7 G/DL (ref 12–16)
IMM GRANULOCYTES # BLD AUTO: 0.29 K/UL (ref 0–0.04)
IMM GRANULOCYTES NFR BLD: 2.1 % (ref 0–0.4)
LYMPHOCYTES # BLD AUTO: 1.74 K/UL (ref 1–4.8)
LYMPHOCYTES NFR BLD AUTO: 12.7 % (ref 27–41)
MAGNESIUM SERPL-MCNC: 2.1 MG/DL (ref 1.7–2.3)
MCH RBC QN AUTO: 28 PG (ref 27–31)
MCHC RBC AUTO-ENTMCNC: 32.5 G/DL (ref 32–36)
MCV RBC AUTO: 86.1 FL (ref 80–96)
MONOCYTES # BLD AUTO: 0.39 K/UL (ref 0–0.8)
MONOCYTES NFR BLD AUTO: 2.8 % (ref 2–6)
MPC BLD CALC-MCNC: 10.1 FL (ref 9.4–12.4)
NEUTROPHILS # BLD AUTO: 11.26 K/UL (ref 1.8–7.7)
NEUTROPHILS NFR BLD AUTO: 82.2 % (ref 53–65)
NRBC # BLD AUTO: 0 X10E3/UL
NRBC, AUTO (.00): 0 %
PLATELET # BLD AUTO: 313 K/UL (ref 150–400)
POTASSIUM SERPL-SCNC: 3.7 MMOL/L (ref 3.5–5.1)
RBC # BLD AUTO: 4.18 M/UL (ref 4.2–5.4)
SODIUM SERPL-SCNC: 140 MMOL/L (ref 136–145)
WBC # BLD AUTO: 13.71 K/UL (ref 4.5–11)

## 2022-11-08 PROCEDURE — 63600175 PHARM REV CODE 636 W HCPCS: Performed by: HOSPITALIST

## 2022-11-08 PROCEDURE — 99217 PR OBSERVATION CARE DISCHARGE: CPT | Mod: ,,, | Performed by: HOSPITALIST

## 2022-11-08 PROCEDURE — 80048 BASIC METABOLIC PNL TOTAL CA: CPT | Performed by: HOSPITALIST

## 2022-11-08 PROCEDURE — 99217 PR OBSERVATION CARE DISCHARGE: ICD-10-PCS | Mod: ,,, | Performed by: HOSPITALIST

## 2022-11-08 PROCEDURE — 83735 ASSAY OF MAGNESIUM: CPT | Performed by: HOSPITALIST

## 2022-11-08 PROCEDURE — 36415 COLL VENOUS BLD VENIPUNCTURE: CPT | Performed by: HOSPITALIST

## 2022-11-08 PROCEDURE — G0378 HOSPITAL OBSERVATION PER HR: HCPCS

## 2022-11-08 PROCEDURE — 25000003 PHARM REV CODE 250: Performed by: HOSPITALIST

## 2022-11-08 PROCEDURE — 85025 COMPLETE CBC W/AUTO DIFF WBC: CPT | Performed by: HOSPITALIST

## 2022-11-08 RX ORDER — SULFAMETHOXAZOLE AND TRIMETHOPRIM 800; 160 MG/1; MG/1
1 TABLET ORAL 2 TIMES DAILY
Qty: 20 TABLET | Refills: 0 | Status: SHIPPED | OUTPATIENT
Start: 2022-11-08 | End: 2022-11-18

## 2022-11-08 RX ORDER — KETOROLAC TROMETHAMINE 10 MG/1
10 TABLET, FILM COATED ORAL EVERY 6 HOURS
Qty: 20 TABLET | Refills: 0 | Status: SHIPPED | OUTPATIENT
Start: 2022-11-08 | End: 2022-11-13

## 2022-11-08 RX ADMIN — POTASSIUM BICARBONATE 25 MEQ: 977.5 TABLET, EFFERVESCENT ORAL at 08:11

## 2022-11-08 RX ADMIN — TAMSULOSIN HYDROCHLORIDE 0.4 MG: 0.4 CAPSULE ORAL at 08:11

## 2022-11-08 NOTE — NURSING
Pt refusing telemetry monitor at this time. Sn discussed the need for monitoring her heart due to her low potassium level when she was admitted.  Pt remains to request telemetry monitor to be taken off.  Sn notified the monitoring station of patients request.

## 2022-11-08 NOTE — PROGRESS NOTES
[facility]  [facility] Medicine  Progress Note    Patient Name: Ami Flores  MRN: [id number]  Patient Class: OP- Observation   Admission Date: 11/5/2022  Length of Stay: 0 days  Attending Physician: Anca Greco MD  Primary Care Provider: ESVIN Ocasio        Subjective:     Principal Problem:Acute unilateral obstructive uropathy    HPI:  60yo woman history of nephrolithiasis, recurrent UTIs, GERD, HLD who presents with worsening back pain.  She states that the pain started three days prior to admission and has been progressively worse.  It is a dull pain that is on the left side.  Lying on her back makes it better.  Other exacerbating factors are unclear.  When the pain occurs, it is 4/10 but it is nagging.    She states she has had kidney stones since she was 13 years old. She has not seen a urologist recently.  Aside from the back pain, she has no other complaints.     She presented to the [place] ER department this morning.  After nephrolithiasis was found, she was transferred to [facility] for evaluation by urology.  Once at [facility], there was difficulty entering the patient into the electronic medical record.  She was started on a regular diet. She states she can only have toradol for pain medication.        Overview/Hospital Course:  11/6:  Plan for stent placement tomorrow.    11/7:  Plan for stent placement today.  Anticipate discharge tomorrow.      Interval History:     Review of Systems   Constitutional:  Negative for activity change, chills, fatigue and fever.   HENT:  Negative for congestion and sore throat.    Respiratory:  Negative for chest tightness.    Gastrointestinal:  Positive for nausea. Negative for abdominal distention, abdominal pain and diarrhea.   Endocrine: Negative for cold intolerance and heat intolerance.   Genitourinary:  Negative for dysuria.   Musculoskeletal:  Negative for arthralgias and back pain.   Skin:  Negative for color change and rash.    Neurological:  Positive for headaches. Negative for dizziness and tremors.   Psychiatric/Behavioral:  Negative for agitation. The patient is not nervous/anxious.    Objective:     Vital Signs (Most Recent):  Temp: 98.2 °F (36.8 °C) (11/07/22 1730)  Pulse: 85 (11/07/22 1730)  Resp: 18 (11/07/22 1730)  BP: 132/73 (11/07/22 1730)  SpO2: 99 % (11/07/22 1730) Vital Signs (24h Range):  Temp:  [96.9 °F (36.1 °C)-99.7 °F (37.6 °C)] 98.2 °F (36.8 °C)  Pulse:  [] 85  Resp:  [18-25] 18  SpO2:  [95 %-100 %] 99 %  BP: (100-154)/(57-96) 132/73     Weight: 69.3 kg (152 lb 12.5 oz)  Body mass index is 28.87 kg/m².    Intake/Output Summary (Last 24 hours) at 11/7/2022 1814  Last data filed at 11/7/2022 1233  Gross per 24 hour   Intake 100 ml   Output 6 ml   Net 94 ml      Physical Exam  Constitutional:       Appearance: Normal appearance.   HENT:      Head: Normocephalic and atraumatic.      Nose: Nose normal.      Mouth/Throat:      Mouth: Mucous membranes are moist.      Pharynx: Oropharynx is clear.   Eyes:      Extraocular Movements: Extraocular movements intact.      Conjunctiva/sclera: Conjunctivae normal.      Pupils: Pupils are equal, round, and reactive to light.   Cardiovascular:      Rate and Rhythm: Normal rate and regular rhythm.      Pulses: Normal pulses.      Heart sounds: Normal heart sounds.   Pulmonary:      Effort: Pulmonary effort is normal.      Breath sounds: Normal breath sounds.   Abdominal:      General: Abdomen is flat. Bowel sounds are normal.      Palpations: Abdomen is soft.      Tenderness: There is abdominal tenderness.      Comments: Left flank tenderness     Musculoskeletal:         General: Normal range of motion.      Cervical back: Normal range of motion and neck supple.   Skin:     General: Skin is warm and dry.   Neurological:      General: No focal deficit present.      Mental Status: She is alert and oriented to person, place, and time.   Psychiatric:         Mood and Affect: Mood  normal.         Behavior: Behavior normal.       Significant Labs: All pertinent labs within the past 24 hours have been reviewed.    Significant Imaging: I have reviewed all pertinent imaging results/findings within the past 24 hours.      Assessment/Plan:      * Left obstructive uropathy due to Nephrolithiasis  Long history of nephrolithiasis (since age 13)  Recurrent UTIs  7.8mm stone on CT scan with hydronephrosis    Levofloxacin 500mg daily.   Consult urology Dr. Brooks   IV fluids - 125cc/hr   Pain management with toradol     11/6:  Stent placement tomorrow per Urology.    Antibiotics (From admission, onward)    Start     Stop Route Frequency Ordered    11/06/22 0900  levoFLOXacin 500 mg/100 mL IVPB 500 mg         -- IV Every 24 hours (non-standard times) 11/05/22 1749            Results for orders placed or performed during the hospital encounter of 11/05/22 (from the past 2160 hour(s))   CT Renal Stone Study ABD Pelvis WO    Impression    7.6 mm calculus left proximal ureter with moderate left hydronephrosis.      Electronically signed by: Micah Fowler  Date:    11/05/2022  Time:    09:59           Hypokalemia  K 3.4.  Supplemental potassium ordered.         Hyperlipidemia  Continue home statin       GERD (gastroesophageal reflux disease)  Continue home PPI.       Bronchitis  Recent history of bronchitis and was on inhalers and steroids.    Symptoms have improved.        Anxiety  Tranxene as needed. Continue.          VTE Risk Mitigation (From admission, onward)         Ordered     enoxaparin injection 40 mg  Daily         11/05/22 1738     Place sequential compression device  Until discontinued         11/05/22 1738     IP VTE HIGH RISK PATIENT  Once         11/05/22 1738                Discharge Planning   CHRISTINA:      Code Status: Full Code   Is the patient medically ready for discharge?:     Reason for patient still in hospital (select all that apply): Treatment                     Anca Greco,  MD  Department of Hospital Medicine   [facility]

## 2022-11-08 NOTE — PROGRESS NOTES
Urine culture grew out >[id number] Proteus Mirabilis which should be sensitive to Levaquin. Patient was started on IV levaquin while in the ED, transferred to [facility] and Levaquin was continued.

## 2022-11-08 NOTE — ASSESSMENT & PLAN NOTE
Long history of nephrolithiasis (since age 13)  Recurrent UTIs  7.8mm stone on CT scan with hydronephrosis    Levofloxacin 500mg daily.   Consult urology Dr. Brooks   IV fluids - 125cc/hr   Pain management with toradol     11/6:  Stent placement tomorrow per Urology.    11/7: s/p stent placement.   F/u with urology next Thursday.   Bactrim DS 1 tab twice a day for 10 days.     Antibiotics (From admission, onward)    Start     Stop Route Frequency Ordered    11/06/22 0900  levoFLOXacin 500 mg/100 mL IVPB 500 mg         -- IV Every 24 hours (non-standard times) 11/05/22 1749            Results for orders placed or performed during the hospital encounter of 11/05/22 (from the past 2160 hour(s))   CT Renal Stone Study ABD Pelvis WO    Impression    7.6 mm calculus left proximal ureter with moderate left hydronephrosis.      Electronically signed by: Micah Fowler  Date:    11/05/2022  Time:    09:59

## 2022-11-08 NOTE — PROGRESS NOTES
Ms. Flores is feeling better and looks much better.  She is having some mild bladder discomfort but no flank discomfort since the stent insertion.  It will be fine to discharge her on oral antimicrobials.  Since she has allergy to penicillin so I would place her on Bactrim DS or Ceftin 250 mg.  She needs to be treated for another week.  We will tentatively schedule her for left ureterorenoscopy on Thursday November 17.  I will see her back in the office with a KUB on Tuesday or Wednesday of next week.

## 2022-11-08 NOTE — SUBJECTIVE & OBJECTIVE
Interval History:     Review of Systems   Constitutional:  Negative for activity change, chills, fatigue and fever.   HENT:  Negative for congestion and sore throat.    Respiratory:  Negative for chest tightness.    Gastrointestinal:  Positive for nausea. Negative for abdominal distention, abdominal pain and diarrhea.   Endocrine: Negative for cold intolerance and heat intolerance.   Genitourinary:  Negative for dysuria.   Musculoskeletal:  Negative for arthralgias and back pain.   Skin:  Negative for color change and rash.   Neurological:  Positive for headaches. Negative for dizziness and tremors.   Psychiatric/Behavioral:  Negative for agitation. The patient is not nervous/anxious.    Objective:     Vital Signs (Most Recent):  Temp: 98.2 °F (36.8 °C) (11/07/22 1730)  Pulse: 85 (11/07/22 1730)  Resp: 18 (11/07/22 1730)  BP: 132/73 (11/07/22 1730)  SpO2: 99 % (11/07/22 1730) Vital Signs (24h Range):  Temp:  [96.9 °F (36.1 °C)-99.7 °F (37.6 °C)] 98.2 °F (36.8 °C)  Pulse:  [] 85  Resp:  [18-25] 18  SpO2:  [95 %-100 %] 99 %  BP: (100-154)/(57-96) 132/73     Weight: 69.3 kg (152 lb 12.5 oz)  Body mass index is 28.87 kg/m².    Intake/Output Summary (Last 24 hours) at 11/7/2022 1814  Last data filed at 11/7/2022 1233  Gross per 24 hour   Intake 100 ml   Output 6 ml   Net 94 ml      Physical Exam  Constitutional:       Appearance: Normal appearance.   HENT:      Head: Normocephalic and atraumatic.      Nose: Nose normal.      Mouth/Throat:      Mouth: Mucous membranes are moist.      Pharynx: Oropharynx is clear.   Eyes:      Extraocular Movements: Extraocular movements intact.      Conjunctiva/sclera: Conjunctivae normal.      Pupils: Pupils are equal, round, and reactive to light.   Cardiovascular:      Rate and Rhythm: Normal rate and regular rhythm.      Pulses: Normal pulses.      Heart sounds: Normal heart sounds.   Pulmonary:      Effort: Pulmonary effort is normal.      Breath sounds: Normal breath sounds.    Abdominal:      General: Abdomen is flat. Bowel sounds are normal.      Palpations: Abdomen is soft.      Tenderness: There is abdominal tenderness.      Comments: Left flank tenderness     Musculoskeletal:         General: Normal range of motion.      Cervical back: Normal range of motion and neck supple.   Skin:     General: Skin is warm and dry.   Neurological:      General: No focal deficit present.      Mental Status: She is alert and oriented to person, place, and time.   Psychiatric:         Mood and Affect: Mood normal.         Behavior: Behavior normal.       Significant Labs: All pertinent labs within the past 24 hours have been reviewed.    Significant Imaging: I have reviewed all pertinent imaging results/findings within the past 24 hours.

## 2022-11-08 NOTE — DISCHARGE SUMMARY
[facility]  [facility]  Discharge Summary      Patient Name: Ami Flores  MRN: [id number]  ZAID: [id number]  Patient Class: OP- Observation  Admission Date: 11/5/2022  Hospital Length of Stay: 0 days  Discharge Date and Time:  11/08/2022 9:49 AM  Attending Physician: Anca Greco MD   Discharging Provider: Anca Greco MD  Primary Care Provider: ESVIN Ocasio    Primary Care Team: Networked reference to record PCT     HPI:   60yo woman history of nephrolithiasis, recurrent UTIs, GERD, HLD who presents with worsening back pain.  She states that the pain started three days prior to admission and has been progressively worse.  It is a dull pain that is on the left side.  Lying on her back makes it better.  Other exacerbating factors are unclear.  When the pain occurs, it is 4/10 but it is nagging.    She states she has had kidney stones since she was 13 years old. She has not seen a urologist recently.  Aside from the back pain, she has no other complaints.     She presented to the [place] ER department this morning.  After nephrolithiasis was found, she was transferred to [facility] for evaluation by urology.  Once at [facility], there was difficulty entering the patient into the electronic medical record.  She was started on a regular diet. She states she can only have toradol for pain medication.        Procedure(s) (LRB):  CYSTOSCOPY, WITH URETERAL STENT INSERTION (Left)      Hospital Course:   11/6:  Plan for stent placement tomorrow.    11/7:  Plan for stent placement today.  Anticipate discharge tomorrow.       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   Consult urology Dr. Brooks   IV fluids - 125cc/hr   Pain management with toradol     11/6:  Stent placement tomorrow per Urology.    11/7: s/p stent placement.   F/u with urology next Thursday.   Bactrim DS 1 tab twice a day for 10 days.     Antibiotics (From admission, onward)    Start     Stop Route Frequency Ordered    11/06/22 0900  levoFLOXacin 500 mg/100 mL IVPB 500 mg         -- IV Every 24 hours (non-standard times) 11/05/22 1749            Results for orders placed or performed during the hospital encounter of 11/05/22 (from the past 2160 hour(s))   CT Renal Stone Study ABD Pelvis WO    Impression    7.6 mm calculus left proximal ureter with moderate left hydronephrosis.      Electronically signed by: Micah Fowler  Date:    11/05/2022  Time:    09:59           Hypokalemia  K 3.4.  Supplemental potassium ordered.         Hyperlipidemia  Continue home statin       GERD (gastroesophageal reflux disease)  Continue home PPI.       Bronchitis  Recent history of bronchitis and was on inhalers and steroids.    Symptoms have improved.        Anxiety  Tranxene as needed. Continue.          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    Specialties: Family Medicine, Emergency Medicine  Why: hospital follow-up and follow-up post  urology procedure  Contact information:  [address]  [facility] [place] [address]  [phone]                       Patient Instructions:      Diet Adult Regular     Notify your health care provider if you experience any of the following:  temperature >100.4     Notify your health care provider if you experience any of the following:  persistent nausea and vomiting or diarrhea     Notify your health care provider if you experience any of the following:  severe uncontrolled pain     Notify your health care provider if you experience any of the following:  redness, tenderness, or signs of infection (pain, swelling, redness, odor or green/yellow discharge around incision site)     Notify your health care provider if you experience any of the following:  difficulty breathing or increased cough     Notify your health care provider if you experience any of the following:  severe persistent headache     Notify your health care provider if you experience any of the following:  worsening rash     Notify your health care provider if you experience any of the following:  persistent dizziness, light-headedness, or visual disturbances     Notify your health care provider if you experience any of the following:  increased confusion or weakness     Notify your health care provider if you experience any of the following:     Activity as tolerated       Significant Diagnostic Studies: Labs:   BMP:   Recent Labs   Lab 11/07/22  0356 11/08/22  0621   * 175*    140   K 3.6 3.7   * 105   CO2 23 22   BUN 10 15   CREATININE 1.24* 1.22*   CALCIUM 7.8* 8.6   MG 1.8 2.1    and CBC   Recent Labs   Lab 11/07/22  0356 11/08/22  0621   WBC 8.97 13.71*   HGB 10.3* 11.7*   HCT 31.4* 36.0*    313       Pending Diagnostic Studies:     Procedure Component Value Units Date/Time    Urinalysis, Reflex to Urine Culture [[id number]]     Order Status: Sent Lab Status: No result     Specimen: Urine           Medications:  Reconciled Home Medications:      Medication List      START taking these medications    ketorolac 10 mg tablet  Commonly known as: TORADOL  Take 1 tablet (10 mg total) by mouth every 6 (six) hours. for 5 days     sulfamethoxazole-trimethoprim 800-160mg 800-160 mg Tab  Commonly known as: BACTRIM DS  Take 1 tablet by mouth 2 (two) times daily. for 10 days        CONTINUE taking these medications    albuterol 2.5 mg /3 mL (0.083 %) nebulizer solution  Commonly known as: PROVENTIL  Take 3 mLs (2.5 mg total) by nebulization 3 (three) times daily. Rescue     ergocalciferol 50,000 unit Cap  Commonly known as: ERGOCALCIFEROL  Take 1 capsule (50,000 Units total) by mouth every 7 days.     lansoprazole 30 MG capsule  Commonly known as: PREVACID  TAKE ONE CAPSULE BY MOUTH EVERY DAY BEFORE a meal     lovastatin 10 MG tablet  Commonly known as: MEVACOR  TAKE ONE TABLET BY MOUTH EVERY EVENING WITH meal     naproxen 500 MG tablet  Commonly known as: NAPROSYN  Take 500 mg by mouth 2 (two) times daily as needed.     NINJACOF 12.5-12.5 mg/5 mL Liqd  Generic drug: pyrilamine-chlophedianoL  Take 10 mLs by mouth every 8 (eight) hours as needed (cough).     ondansetron 4 MG tablet  Commonly known as: ZOFRAN  Take 1 tablet (4 mg total) by mouth every 8 (eight) hours as needed for Nausea.     tamsulosin 0.4 mg Cap  Commonly known as: FLOMAX  Take 1 capsule (0.4 mg total) by mouth once daily.        STOP taking these medications    clorazepate 3.75 MG Tab  Commonly known as: TRANXENE     doxycycline 100 MG capsule  Commonly known as: MONODOX            Indwelling Lines/Drains at time of discharge:   Lines/Drains/Airways     None                 Time spent on the discharge of patient: >30 minutes         Anca Greco MD  Department of Hospital Medicine  [facility]

## 2022-11-08 NOTE — PLAN OF CARE
Problem: Adult Inpatient Plan of Care  Goal: Readiness for Transition of Care  Outcome: Ongoing, Progressing     Problem: Pain Acute  Goal: Acceptable Pain Control and Functional Ability  Outcome: Ongoing, Progressing     Problem: Fall Injury Risk  Goal: Absence of Fall and Fall-Related Injury  Outcome: Ongoing, Progressing

## 2022-11-15 ENCOUNTER — OFFICE VISIT (OUTPATIENT)
Dept: UROLOGY | Facility: CLINIC | Age: 62
End: 2022-11-15
Payer: COMMERCIAL

## 2022-11-15 ENCOUNTER — HOSPITAL ENCOUNTER (OUTPATIENT)
Dept: RADIOLOGY | Facility: HOSPITAL | Age: 62
Discharge: HOME | End: 2022-11-15
Attending: UROLOGY
Payer: COMMERCIAL

## 2022-11-15 VITALS
SYSTOLIC BLOOD PRESSURE: 145 MMHG | HEIGHT: 61 IN | DIASTOLIC BLOOD PRESSURE: 90 MMHG | HEART RATE: 116 BPM | WEIGHT: 146 LBS | BODY MASS INDEX: 27.56 KG/M2

## 2022-11-15 DIAGNOSIS — N20.0 RECURRENT NEPHROLITHIASIS: ICD-10-CM

## 2022-11-15 DIAGNOSIS — N39.0 URINARY TRACT INFECTION WITH HEMATURIA, SITE UNSPECIFIED: ICD-10-CM

## 2022-11-15 DIAGNOSIS — N20.0 LEFT RENAL STONE: ICD-10-CM

## 2022-11-15 DIAGNOSIS — N20.1 LEFT URETERAL STONE: Primary | ICD-10-CM

## 2022-11-15 DIAGNOSIS — N13.30 HYDRONEPHROSIS OF LEFT KIDNEY: ICD-10-CM

## 2022-11-15 DIAGNOSIS — N20.0 KIDNEY STONE: ICD-10-CM

## 2022-11-15 DIAGNOSIS — R31.9 URINARY TRACT INFECTION WITH HEMATURIA, SITE UNSPECIFIED: ICD-10-CM

## 2022-11-15 PROCEDURE — 74018 XR KUB: ICD-10-PCS | Mod: 26,,, | Performed by: STUDENT IN AN ORGANIZED HEALTH CARE EDUCATION/TRAINING PROGRAM

## 2022-11-15 PROCEDURE — 3077F SYST BP >= 140 MM HG: CPT | Mod: CPTII,,, | Performed by: UROLOGY

## 2022-11-15 PROCEDURE — 3044F PR MOST RECENT HEMOGLOBIN A1C LEVEL <7.0%: ICD-10-PCS | Mod: CPTII,,, | Performed by: UROLOGY

## 2022-11-15 PROCEDURE — 74018 RADEX ABDOMEN 1 VIEW: CPT | Mod: TC

## 2022-11-15 PROCEDURE — 87086 CULTURE, URINE: ICD-10-PCS | Mod: ,,, | Performed by: CLINICAL MEDICAL LABORATORY

## 2022-11-15 PROCEDURE — 3008F BODY MASS INDEX DOCD: CPT | Mod: CPTII,,, | Performed by: UROLOGY

## 2022-11-15 PROCEDURE — 99214 PR OFFICE/OUTPT VISIT, EST, LEVL IV, 30-39 MIN: ICD-10-PCS | Mod: S$PBB,,, | Performed by: UROLOGY

## 2022-11-15 PROCEDURE — 87086 URINE CULTURE/COLONY COUNT: CPT | Mod: ,,, | Performed by: CLINICAL MEDICAL LABORATORY

## 2022-11-15 PROCEDURE — 3008F PR BODY MASS INDEX (BMI) DOCUMENTED: ICD-10-PCS | Mod: CPTII,,, | Performed by: UROLOGY

## 2022-11-15 PROCEDURE — 3080F DIAST BP >= 90 MM HG: CPT | Mod: CPTII,,, | Performed by: UROLOGY

## 2022-11-15 PROCEDURE — 1160F PR REVIEW ALL MEDS BY PRESCRIBER/CLIN PHARMACIST DOCUMENTED: ICD-10-PCS | Mod: CPTII,,, | Performed by: UROLOGY

## 2022-11-15 PROCEDURE — 1159F MED LIST DOCD IN RCRD: CPT | Mod: CPTII,,, | Performed by: UROLOGY

## 2022-11-15 PROCEDURE — 99214 OFFICE O/P EST MOD 30 MIN: CPT | Mod: PBBFAC | Performed by: UROLOGY

## 2022-11-15 PROCEDURE — 3044F HG A1C LEVEL LT 7.0%: CPT | Mod: CPTII,,, | Performed by: UROLOGY

## 2022-11-15 PROCEDURE — 99214 OFFICE O/P EST MOD 30 MIN: CPT | Mod: S$PBB,,, | Performed by: UROLOGY

## 2022-11-15 PROCEDURE — 3080F PR MOST RECENT DIASTOLIC BLOOD PRESSURE >= 90 MM HG: ICD-10-PCS | Mod: CPTII,,, | Performed by: UROLOGY

## 2022-11-15 PROCEDURE — 1160F RVW MEDS BY RX/DR IN RCRD: CPT | Mod: CPTII,,, | Performed by: UROLOGY

## 2022-11-15 PROCEDURE — 1159F PR MEDICATION LIST DOCUMENTED IN MEDICAL RECORD: ICD-10-PCS | Mod: CPTII,,, | Performed by: UROLOGY

## 2022-11-15 PROCEDURE — 74018 RADEX ABDOMEN 1 VIEW: CPT | Mod: 26,,, | Performed by: STUDENT IN AN ORGANIZED HEALTH CARE EDUCATION/TRAINING PROGRAM

## 2022-11-15 PROCEDURE — 3077F PR MOST RECENT SYSTOLIC BLOOD PRESSURE >= 140 MM HG: ICD-10-PCS | Mod: CPTII,,, | Performed by: UROLOGY

## 2022-11-15 NOTE — PROGRESS NOTES
Subjective:       Patient ID: Ami Flores is a 61 y.o. female.    Chief Complaint: Follow-up (KUB- to schedule surgery for Thursday. )    Patient Name: Ami Flores  MRN: [id number]  [facility] [place]: [id number]  Patient Class: OP- Observation  Admission Date: 11/5/2022  Hospital Length of Stay: 0 days  Discharge Date and Time:  11/08/2022 9:49 AM  Attending Physician: Anca Greco MD   Discharging Provider: Anca Greco MD  Primary Care Provider: ESVIN Ocasio     Primary Care Team: Networked reference to record PCT      HPI:   60yo woman history of nephrolithiasis, recurrent UTIs, GERD, HLD who presents with worsening back pain.  She states that the pain started three days prior to admission and has been progressively worse.  It is a dull pain that is on the left side.  Lying on her back makes it better.  Other exacerbating factors are unclear.  When the pain occurs, it is 4/10 but it is nagging.    She states she has had kidney stones since she was 13 years old. She has not seen a urologist recently.  Aside from the back pain, she has no other complaints.      She presented to the [place] ER department this morning.  After nephrolithiasis was found, she was transferred to [facility] for evaluation by urology.  Once at [facility], there was difficulty entering the patient into the electronic medical record.  She was started on a regular diet. She states she can only have toradol for pain medication.          Procedure(s) (LRB):  CYSTOSCOPY, WITH URETERAL STENT INSERTION (Left)       Hospital Course:   11/6:  Plan for stent placement tomorrow.     11/7:  Plan for stent placement today.  Anticipate discharge tomorrow.        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   Consult urology Dr. Brooks   IV fluids - 125cc/hr   Pain management with toradol      11/6:  Stent placement tomorrow per Urology.     11/7: s/p stent placement.   F/u with urology next Thursday.   Bactrim DS 1 tab twice a day for 10 days.      Antibiotics (From admission, onward)    Start     Stop Route Frequency Ordered    11/06/22 0900     levoFLOXacin 500 mg/100 mL IVPB 500 mg        -- IV Every 24 hours (non-standard times) 11/05/22 1749              Results for orders placed or performed during the hospital encounter of 11/05/22 (from the past 2160 hour(s))  CT Renal Stone Study ABD Pelvis WO    Impression    7.6 mm calculus left proximal ureter with moderate left hydronephrosis.        Electronically signed by:       Micah Fowler  Date:                                                11/05/2022  Time:                                                09:59              Hypokalemia  K 3.4.  Supplemental potassium ordered.            Hyperlipidemia  Continue home statin         GERD (gastroesophageal reflux disease)  Continue home PPI.         Bronchitis  Recent history of bronchitis and was on inhalers and steroids.    Symptoms have improved.          Anxiety  Tranxene as needed. Continue.             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   Specialties: Family Medicine, Emergency Medicine  Why: hospital follow-up and follow-up post urology procedure  Contact information:  [address]  [facility] [place] [address]  [phone]  ---------------------------------------------------------------------------------------------  The above is the discharge summary of Dr. Anca Greco from November 8.    Patient seen in consultation by Dr. Nelia Brooks on Sunday and had a stent insertion by me for left upper ureter stone on Monday November 7.  She also has a larger stone in her left lower pole.  Patient has done well since discharge and is in today for preliminary checkup in anticipation of left ureterorenoscopy with laser lithotripsy of the 2 stones on Thursday November 17.  Patient had a lot of stent problems initially but has done much better the last few days and has been tolerating much better.  She is agreeable to going ahead with the left ureterorenoscopy with fragmentation of stones and stent exchange on Thursday.  She has been on Bactrim DS since she went home and has been tolerating okay. [November 15, 2022]  Review of Systems      Objective:      Physical Exam  Constitutional:       Appearance: Normal appearance. She is normal weight.   HENT:      Head: Normocephalic.      Right Ear: External ear normal.      Left Ear: External ear normal.      Nose: Nose normal.      Mouth/Throat:      Mouth: Mucous membranes are moist.      Pharynx: Oropharynx is clear.   Eyes:      Pupils: Pupils are equal, round, and reactive to light.   Neck:      Vascular: No carotid bruit.   Cardiovascular:      Rate and Rhythm: Normal rate and regular rhythm.   Pulmonary:      Effort: Pulmonary effort is normal.      Breath sounds: Normal breath sounds.   Abdominal:      Palpations: Abdomen is soft.   Musculoskeletal:      Cervical back: Normal range of motion  and neck supple.   Lymphadenopathy:      Cervical: No cervical adenopathy.   Skin:     General: Skin is warm and dry.   Neurological:      General: No focal deficit present.      Mental Status: She is alert.   Psychiatric:         Mood and Affect: Mood normal.         Behavior: Behavior normal.     Urinalysis revealed several red blood cells and many pus cells.  Dipstick had a trace of blood, trace of protein, 2+ leukocytes, pH 5.5, and specific gravity 1.025  Assessment:       Problem List Items Addressed This Visit    None  Visit Diagnoses       Left ureteral stone    -  Primary    Urinary tract infection with hematuria, site unspecified        Relevant Orders    Urine culture    Left renal stone        Hydronephrosis of left kidney        Recurrent nephrolithiasis                Plan:         Patient will be admitted to outpatient surgery on Thursday November 17th at 6:30 a.m..  She understands to be NPO except morning medication with a small amount of water.  She is concerned about nausea related to her surgery as that is usually a problem for her.  She is agreeable to going ahead with the procedure.  Informed consent obtained.  Repeat urine culture submitted.

## 2022-11-15 NOTE — PATIENT INSTRUCTIONS
Patient will be admitted to outpatient surgery on Thursday November 17th at 6:30 a.m..  She understands to be NPO except morning medication with a small amount of water.  She is concerned about nausea related to her surgery as that is usually a problem for her.  She is agreeable to going ahead with the procedure.  Informed consent obtained.  Repeat urine culture submitted.    Preop instructions went over and given to patient, instructed to arrive at ambulatory services on Thurs. 11/17/22 at 6:30; she verbalized understanding.  Urine sent for culture today, CBC and BMP done 11/8/22; EKG to be done am of surgery.

## 2022-11-15 NOTE — H&P (VIEW-ONLY)
Subjective:       Patient ID: Ami Flores is a 61 y.o. female.    Chief Complaint: Follow-up (KUB- to schedule surgery for Thursday. )    Patient Name: Ami Flores  MRN: [id number]  [facility]: [id number]  Patient Class: OP- Observation  Admission Date: 11/5/2022  Hospital Length of Stay: 0 days  Discharge Date and Time:  11/08/2022 9:49 AM  Attending Physician: Anca Greco MD   Discharging Provider: Anca Greco MD  Primary Care Provider: ESVIN Ocasio     Primary Care Team: Networked reference to record PCT      HPI:   62yo woman history of nephrolithiasis, recurrent UTIs, GERD, HLD who presents with worsening back pain.  She states that the pain started three days prior to admission and has been progressively worse.  It is a dull pain that is on the left side.  Lying on her back makes it better.  Other exacerbating factors are unclear.  When the pain occurs, it is 4/10 but it is nagging.    She states she has had kidney stones since she was 13 years old. She has not seen a urologist recently.  Aside from the back pain, she has no other complaints.      She presented to the [facility] ER department this morning.  After nephrolithiasis was found, she was transferred to [facility] for evaluation by urology.  Once at [facility], there was difficulty entering the patient into the electronic medical record.  She was started on a regular diet. She states she can only have toradol for pain medication.          Procedure(s) (LRB):  CYSTOSCOPY, WITH URETERAL STENT INSERTION (Left)       Hospital Course:   11/6:  Plan for stent placement tomorrow.     11/7:  Plan for stent placement today.  Anticipate discharge tomorrow.        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   Consult urology Dr. Brooks   IV fluids - 125cc/hr   Pain management with toradol      11/6:  Stent placement tomorrow per Urology.     11/7: s/p stent placement.   F/u with urology next Thursday.   Bactrim DS 1 tab twice a day for 10 days.      Antibiotics (From admission, onward)    Start     Stop Route Frequency Ordered    11/06/22 0900     levoFLOXacin 500 mg/100 mL IVPB 500 mg        -- IV Every 24 hours (non-standard times) 11/05/22 1749              Results for orders placed or performed during the hospital encounter of 11/05/22 (from the past 2160 hour(s))  CT Renal Stone Study ABD Pelvis WO    Impression    7.6 mm calculus left proximal ureter with moderate left hydronephrosis.        Electronically signed by:       Micah Fowler  Date:                                                11/05/2022  Time:                                                09:59              Hypokalemia  K 3.4.  Supplemental potassium ordered.            Hyperlipidemia  Continue home statin         GERD (gastroesophageal reflux disease)  Continue home PPI.         Bronchitis  Recent history of bronchitis and was on inhalers and steroids.    Symptoms have improved.          Anxiety  Tranxene as needed. Continue.             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   Specialties: Family Medicine, Emergency Medicine  Why: hospital follow-up and follow-up post urology procedure  Contact information:  [address]  [facility] [place] [address]  [phone]  ---------------------------------------------------------------------------------------------  The above is the discharge summary of Dr. Anca Greco from November 8.    Patient seen in consultation by Dr. Nelia Brooks on Sunday and had a stent insertion by me for left upper ureter stone on Monday November 7.  She also has a larger stone in her left lower pole.  Patient has done well since discharge and is in today for preliminary checkup in anticipation of left ureterorenoscopy with laser lithotripsy of the 2 stones on Thursday November 17.  Patient had a lot of stent problems initially but has done much better the last few days and has been tolerating much better.  She is agreeable to going ahead with the left ureterorenoscopy with fragmentation of stones and stent exchange on Thursday.  She has been on Bactrim DS since she went home and has been tolerating okay. [November 15, 2022]  Review of Systems      Objective:      Physical Exam  Constitutional:       Appearance: Normal appearance. She is normal weight.   HENT:      Head: Normocephalic.      Right Ear: External ear normal.      Left Ear: External ear normal.      Nose: Nose normal.      Mouth/Throat:      Mouth: Mucous membranes are moist.      Pharynx: Oropharynx is clear.   Eyes:      Pupils: Pupils are equal, round, and reactive to light.   Neck:      Vascular: No carotid bruit.   Cardiovascular:      Rate and Rhythm: Normal rate and regular rhythm.   Pulmonary:      Effort: Pulmonary effort is normal.      Breath sounds: Normal breath sounds.   Abdominal:      Palpations: Abdomen is soft.   Musculoskeletal:      Cervical back: Normal range of motion  and neck supple.   Lymphadenopathy:      Cervical: No cervical adenopathy.   Skin:     General: Skin is warm and dry.   Neurological:      General: No focal deficit present.      Mental Status: She is alert.   Psychiatric:         Mood and Affect: Mood normal.         Behavior: Behavior normal.     Urinalysis revealed several red blood cells and many pus cells.  Dipstick had a trace of blood, trace of protein, 2+ leukocytes, pH 5.5, and specific gravity 1.025  Assessment:       Problem List Items Addressed This Visit    None  Visit Diagnoses       Left ureteral stone    -  Primary    Urinary tract infection with hematuria, site unspecified        Relevant Orders    Urine culture    Left renal stone        Hydronephrosis of left kidney        Recurrent nephrolithiasis                Plan:         Patient will be admitted to outpatient surgery on Thursday November 17th at 6:30 a.m..  She understands to be NPO except morning medication with a small amount of water.  She is concerned about nausea related to her surgery as that is usually a problem for her.  She is agreeable to going ahead with the procedure.  Informed consent obtained.  Repeat urine culture submitted.

## 2022-11-16 DIAGNOSIS — N20.1 LEFT URETERAL STONE: Primary | ICD-10-CM

## 2022-11-16 RX ORDER — SODIUM CHLORIDE, SODIUM LACTATE, POTASSIUM CHLORIDE, CALCIUM CHLORIDE 600; 310; 30; 20 MG/100ML; MG/100ML; MG/100ML; MG/100ML
INJECTION, SOLUTION INTRAVENOUS CONTINUOUS
Status: CANCELLED | OUTPATIENT
Start: 2022-11-17

## 2022-11-17 ENCOUNTER — ANESTHESIA (OUTPATIENT)
Dept: SURGERY | Facility: HOSPITAL | Age: 62
End: 2022-11-17
Payer: COMMERCIAL

## 2022-11-17 ENCOUNTER — ANESTHESIA EVENT (OUTPATIENT)
Dept: SURGERY | Facility: HOSPITAL | Age: 62
End: 2022-11-17
Payer: COMMERCIAL

## 2022-11-17 ENCOUNTER — HOSPITAL ENCOUNTER (OUTPATIENT)
Facility: HOSPITAL | Age: 62
Discharge: HOME | End: 2022-11-17
Attending: UROLOGY | Admitting: UROLOGY
Payer: COMMERCIAL

## 2022-11-17 VITALS
HEIGHT: 61 IN | RESPIRATION RATE: 16 BRPM | TEMPERATURE: 98 F | BODY MASS INDEX: 27.56 KG/M2 | OXYGEN SATURATION: 97 % | WEIGHT: 146 LBS | SYSTOLIC BLOOD PRESSURE: 118 MMHG | DIASTOLIC BLOOD PRESSURE: 57 MMHG | HEART RATE: 81 BPM

## 2022-11-17 DIAGNOSIS — N20.1 LEFT URETERAL STONE: ICD-10-CM

## 2022-11-17 DIAGNOSIS — N20.0 KIDNEY STONES: Primary | ICD-10-CM

## 2022-11-17 LAB — UA COMPLETE W REFLEX CULTURE PNL UR: NO GROWTH

## 2022-11-17 PROCEDURE — 52356 PR CYSTO/URETERO W/LITHOTRIPSY: ICD-10-PCS | Mod: LT,,, | Performed by: UROLOGY

## 2022-11-17 PROCEDURE — D9220A PRA ANESTHESIA: ICD-10-PCS | Mod: ANES,,, | Performed by: ANESTHESIOLOGY

## 2022-11-17 PROCEDURE — 27000689 HC BLADE LARYNGOSCOPE ANY SIZE: Performed by: ANESTHESIOLOGY

## 2022-11-17 PROCEDURE — 37000008 HC ANESTHESIA 1ST 15 MINUTES: Performed by: UROLOGY

## 2022-11-17 PROCEDURE — 71000016 HC POSTOP RECOV ADDL HR: Performed by: UROLOGY

## 2022-11-17 PROCEDURE — 27201423 OPTIME MED/SURG SUP & DEVICES STERILE SUPPLY: Performed by: UROLOGY

## 2022-11-17 PROCEDURE — D9220A PRA ANESTHESIA: Mod: CRNA,,, | Performed by: NURSE ANESTHETIST, CERTIFIED REGISTERED

## 2022-11-17 PROCEDURE — 36000707: Performed by: UROLOGY

## 2022-11-17 PROCEDURE — 52356 CYSTO/URETERO W/LITHOTRIPSY: CPT | Mod: LT,,, | Performed by: UROLOGY

## 2022-11-17 PROCEDURE — D9220A PRA ANESTHESIA: Mod: ANES,,, | Performed by: ANESTHESIOLOGY

## 2022-11-17 PROCEDURE — C1894 INTRO/SHEATH, NON-LASER: HCPCS | Performed by: UROLOGY

## 2022-11-17 PROCEDURE — 63600175 PHARM REV CODE 636 W HCPCS: Performed by: UROLOGY

## 2022-11-17 PROCEDURE — 37000009 HC ANESTHESIA EA ADD 15 MINS: Performed by: UROLOGY

## 2022-11-17 PROCEDURE — 27000655: Performed by: ANESTHESIOLOGY

## 2022-11-17 PROCEDURE — 25000003 PHARM REV CODE 250: Performed by: UROLOGY

## 2022-11-17 PROCEDURE — C2617 STENT, NON-COR, TEM W/O DEL: HCPCS | Performed by: UROLOGY

## 2022-11-17 PROCEDURE — 27000165 HC TUBE, ETT CUFFED: Performed by: ANESTHESIOLOGY

## 2022-11-17 PROCEDURE — D9220A PRA ANESTHESIA: ICD-10-PCS | Mod: CRNA,,, | Performed by: NURSE ANESTHETIST, CERTIFIED REGISTERED

## 2022-11-17 PROCEDURE — 25000003 PHARM REV CODE 250: Performed by: NURSE ANESTHETIST, CERTIFIED REGISTERED

## 2022-11-17 PROCEDURE — 36000706: Performed by: UROLOGY

## 2022-11-17 PROCEDURE — C1758 CATHETER, URETERAL: HCPCS | Performed by: UROLOGY

## 2022-11-17 PROCEDURE — 71000015 HC POSTOP RECOV 1ST HR: Performed by: UROLOGY

## 2022-11-17 PROCEDURE — C1769 GUIDE WIRE: HCPCS | Performed by: UROLOGY

## 2022-11-17 PROCEDURE — 71000033 HC RECOVERY, INTIAL HOUR: Performed by: UROLOGY

## 2022-11-17 PROCEDURE — 27000510 HC BLANKET BAIR HUGGER ANY SIZE: Performed by: ANESTHESIOLOGY

## 2022-11-17 PROCEDURE — 63600175 PHARM REV CODE 636 W HCPCS: Performed by: NURSE ANESTHETIST, CERTIFIED REGISTERED

## 2022-11-17 PROCEDURE — 27000716 HC OXISENSOR PROBE, ANY SIZE: Performed by: ANESTHESIOLOGY

## 2022-11-17 DEVICE — IMPLANTABLE DEVICE: Type: IMPLANTABLE DEVICE | Site: URETER | Status: FUNCTIONAL

## 2022-11-17 RX ORDER — ONDANSETRON 2 MG/ML
4 INJECTION INTRAMUSCULAR; INTRAVENOUS DAILY PRN
Status: DISCONTINUED | OUTPATIENT
Start: 2022-11-17 | End: 2022-11-17

## 2022-11-17 RX ORDER — ACETAMINOPHEN 10 MG/ML
INJECTION, SOLUTION INTRAVENOUS
Status: DISCONTINUED | OUTPATIENT
Start: 2022-11-17 | End: 2022-11-17

## 2022-11-17 RX ORDER — PROPOFOL 10 MG/ML
VIAL (ML) INTRAVENOUS
Status: DISCONTINUED | OUTPATIENT
Start: 2022-11-17 | End: 2022-11-17

## 2022-11-17 RX ORDER — DEXAMETHASONE SODIUM PHOSPHATE 4 MG/ML
INJECTION, SOLUTION INTRA-ARTICULAR; INTRALESIONAL; INTRAMUSCULAR; INTRAVENOUS; SOFT TISSUE
Status: DISCONTINUED | OUTPATIENT
Start: 2022-11-17 | End: 2022-11-17

## 2022-11-17 RX ORDER — SODIUM CHLORIDE, SODIUM LACTATE, POTASSIUM CHLORIDE, CALCIUM CHLORIDE 600; 310; 30; 20 MG/100ML; MG/100ML; MG/100ML; MG/100ML
INJECTION, SOLUTION INTRAVENOUS CONTINUOUS
Status: DISCONTINUED | OUTPATIENT
Start: 2022-11-17 | End: 2022-11-17 | Stop reason: HOSPADM

## 2022-11-17 RX ORDER — DIPHENHYDRAMINE HYDROCHLORIDE 50 MG/ML
25 INJECTION INTRAMUSCULAR; INTRAVENOUS EVERY 6 HOURS PRN
Status: DISCONTINUED | OUTPATIENT
Start: 2022-11-17 | End: 2022-11-17

## 2022-11-17 RX ORDER — KETOROLAC TROMETHAMINE 30 MG/ML
INJECTION, SOLUTION INTRAMUSCULAR; INTRAVENOUS
Status: DISCONTINUED | OUTPATIENT
Start: 2022-11-17 | End: 2022-11-17

## 2022-11-17 RX ORDER — PROMETHAZINE HYDROCHLORIDE 25 MG/ML
INJECTION, SOLUTION INTRAMUSCULAR; INTRAVENOUS
Status: DISCONTINUED | OUTPATIENT
Start: 2022-11-17 | End: 2022-11-17

## 2022-11-17 RX ORDER — ONDANSETRON 2 MG/ML
INJECTION INTRAMUSCULAR; INTRAVENOUS
Status: DISCONTINUED | OUTPATIENT
Start: 2022-11-17 | End: 2022-11-17

## 2022-11-17 RX ORDER — LIDOCAINE HYDROCHLORIDE 20 MG/ML
INJECTION, SOLUTION EPIDURAL; INFILTRATION; INTRACAUDAL; PERINEURAL
Status: DISCONTINUED | OUTPATIENT
Start: 2022-11-17 | End: 2022-11-17

## 2022-11-17 RX ORDER — ROCURONIUM BROMIDE 10 MG/ML
INJECTION, SOLUTION INTRAVENOUS
Status: DISCONTINUED | OUTPATIENT
Start: 2022-11-17 | End: 2022-11-17

## 2022-11-17 RX ORDER — SCOLOPAMINE TRANSDERMAL SYSTEM 1 MG/1
1 PATCH, EXTENDED RELEASE TRANSDERMAL ONCE
Status: DISCONTINUED | OUTPATIENT
Start: 2022-11-17 | End: 2022-11-17 | Stop reason: HOSPADM

## 2022-11-17 RX ORDER — OXYCODONE HYDROCHLORIDE 5 MG/1
5 TABLET ORAL EVERY 4 HOURS PRN
Status: DISCONTINUED | OUTPATIENT
Start: 2022-11-17 | End: 2022-11-17 | Stop reason: HOSPADM

## 2022-11-17 RX ORDER — CIPROFLOXACIN 2 MG/ML
400 INJECTION, SOLUTION INTRAVENOUS ONCE
Status: COMPLETED | OUTPATIENT
Start: 2022-11-17 | End: 2022-11-17

## 2022-11-17 RX ORDER — ONDANSETRON 2 MG/ML
4 INJECTION INTRAMUSCULAR; INTRAVENOUS EVERY 12 HOURS PRN
Status: DISCONTINUED | OUTPATIENT
Start: 2022-11-17 | End: 2022-11-17 | Stop reason: HOSPADM

## 2022-11-17 RX ADMIN — PROPOFOL 120 MG: 10 INJECTION, EMULSION INTRAVENOUS at 09:11

## 2022-11-17 RX ADMIN — KETOROLAC TROMETHAMINE 60 MG: 30 INJECTION, SOLUTION INTRAMUSCULAR at 10:11

## 2022-11-17 RX ADMIN — DEXAMETHASONE SODIUM PHOSPHATE 8 MG: 4 INJECTION, SOLUTION INTRA-ARTICULAR; INTRALESIONAL; INTRAMUSCULAR; INTRAVENOUS; SOFT TISSUE at 09:11

## 2022-11-17 RX ADMIN — LIDOCAINE HYDROCHLORIDE 75 MG: 20 INJECTION, SOLUTION EPIDURAL; INFILTRATION; INTRACAUDAL; PERINEURAL at 09:11

## 2022-11-17 RX ADMIN — ONDANSETRON 4 MG: 2 INJECTION INTRAMUSCULAR; INTRAVENOUS at 12:11

## 2022-11-17 RX ADMIN — ROCURONIUM BROMIDE 50 MG: 10 INJECTION, SOLUTION INTRAVENOUS at 09:11

## 2022-11-17 RX ADMIN — CIPROFLOXACIN 400 MG: 2 INJECTION, SOLUTION INTRAVENOUS at 09:11

## 2022-11-17 RX ADMIN — SUGAMMADEX 200 MG: 100 INJECTION, SOLUTION INTRAVENOUS at 11:11

## 2022-11-17 RX ADMIN — PROMETHAZINE HYDROCHLORIDE 12.5 MG: 25 INJECTION INTRAMUSCULAR; INTRAVENOUS at 09:11

## 2022-11-17 RX ADMIN — SODIUM CHLORIDE, POTASSIUM CHLORIDE, SODIUM LACTATE AND CALCIUM CHLORIDE: 600; 310; 30; 20 INJECTION, SOLUTION INTRAVENOUS at 07:11

## 2022-11-17 RX ADMIN — ONDANSETRON 8 MG: 2 INJECTION INTRAMUSCULAR; INTRAVENOUS at 09:11

## 2022-11-17 RX ADMIN — ACETAMINOPHEN 1000 MG: 10 INJECTION, SOLUTION INTRAVENOUS at 09:11

## 2022-11-17 NOTE — TRANSFER OF CARE
"Anesthesia Transfer of Care Note    Patient: Ami Flores    Procedure(s) Performed: Procedure(s) (LRB):  URETERORENOSCOPY WITH LASER LITHOTRIPSY AND URETERAL STENT EXCHANGE with basket extraction of stone fragments (Left)    Patient location: PACU    Anesthesia Type: general    Transport from OR: Transported from OR on room air with adequate spontaneous ventilation. Continuous ECG monitoring in transport. Continuous SpO2 monitoring in transport    Post pain: adequate analgesia    Post assessment: no apparent anesthetic complications and tolerated procedure well    Post vital signs: stable    Level of consciousness: responds to stimulation    Nausea/Vomiting: no nausea/vomiting    Complications: none    Transfer of care protocol was followed      Last vitals:   Visit Vitals  /73   Pulse 92   Temp 36.8 °C (98.3 °F)   Resp (!) 24   Ht 5' 1" (1.549 m)   Wt 66.2 kg (146 lb)   LMP  (LMP Unknown)   SpO2 97%   Breastfeeding No   BMI 27.59 kg/m²     "

## 2022-11-17 NOTE — OP NOTE
[facility] - Periop Services  General Surgery  Operative Note    SUMMARY     Date of Procedure: 11/17/2022     Procedure: Procedure(s) (LRB):  URETERORENOSCOPY WITH LASER LITHOTRIPSY AND URETERAL STENT EXCHANGE with basket extraction of stone fragments (Left)       Surgeon(s) and Role:     * Nathen Pierre Jr., MD - Primary    Assistant:  JESSICA THACKER    Pre-Operative Diagnosis: Acute unilateral obstructive uropathy [N13.9] secondary to left upper ureteral stone with branch calculus of left lower pole    Post-Operative Diagnosis: Post-Op Diagnosis Codes:     * Acute unilateral obstructive uropathy [N13.9] secondary to left upper ureteral stone with branch calculus of left lower pole    Anesthesia: General endotracheal    Operative Findings (including complications, if any):     Description of Technical Procedures:  The patient was taken to the hospital cystoscopy.  Satisfactory general endotracheal anesthesia was administered.  She was placed in the dorsal lithotomy position and prepared for cystoscopic and ureteroscopic procedures.  The 21 Welsh Olympus rigid cystoscope was passed into the bladder.  Visualization was carried out with the lateral and Foroblique lens.  No tumors ,stones, or diverticula were seen.  Left ureteral stent was seen protruding from the left orifice and there was actually surprisingly little edema present around the orifice.  Right orifice was completely normal.  No other abnormalities noted bladder.  The stent protruding from the left ureter was grasped with grasping forceps and pulled through the urethral meatus.  A 0.038 guidewire was passed through the stent into the left kidney in good position.  This was placed in the sheath and protected to be used as a safety wire.  The cystoscope was placed back in the bladder and ureteral access catheter passed into the left ureter.  A 2nd 0.038 guidewire was passed left kidney.  This was used as a working wire.  Bladder was  drained and cystoscope removed.  The obturator of a navigator was passed over the guidewire up the left ureter satisfactorily.  It was removed and replaced into the navigator sheath and both were passed over the guidewire up the ureter.  The navigator was left in good position at about the level where the stone was previously present.  The Olympus flexible ureterorenoscope was passed through the Navigator up the ureter and the stone that had been noted at the level of the UPJ or just below had actually been pushed back into the kidney into the upper pole calyx.  The 200 micron holmium laser fiber was passed through the flexible ureteral renal scope and used to fragment stone that was the source of the obstructive uropathy.  The scope was then redirected to the lower pole.  The stone that was coming from the lower pole infundibulum could be visualized.  We were able to fragment a portion of the stone protruding into the infundibulum but could not get the 200 micron fiber directed low enough to get the entire stone fragmented.  We did remove some of the fragments taken off with a stone basket and these were collected.  A good bit of the lower pole stone remained and I am sure was well over half of the stone that remained at the end the procedure.  There was some bleeding produced and I felt we should terminate the procedure since we could not get the entire stone fragmented anyway.  The ureteral scope was removed.  The navigator was removed.  The safety wire was converted to the working wire.  The cystoscope was passed back over the guidewire into the bladder.  An 8 Bulgarian x 26 cm loop stent was passed into the left collecting system.  Satisfactory curl was noted in the kidney and the body of the stent came just through the ureteral meatus.  The nylon string was left long for stent be removed by patient in a few days.  Blood clots were irrigated from the bladder.  Bladder was emptied and the scope removed.  The  patient was sent to the PACU in satisfactory condition having tolerated procedure well.    Significant Surgical Tasks Conducted by the Assistant(s), if Applicable:     Estimated Blood Loss (EBL): * No values recorded between 11/17/2022  9:25 AM and 11/17/2022 11:11 AM *   10 mL        Implants:   Implant Name Type Inv. Item Serial No.  Lot No. LRB No. Used Action   polaris loop ureteral stent  8 F x 26 cm     [employer] [id number] Left 1 Implanted       Specimens:   Specimen (24h ago, onward)      None                    Condition: Stable    Disposition: PACU - hemodynamically stable.    Attestation: I was present and scrubbed for the entire procedure.

## 2022-11-17 NOTE — ANESTHESIA PREPROCEDURE EVALUATION
11/17/2022  Ami Flores is a 61 y.o., female.      Pre-op Assessment    I have reviewed the Patient Summary Reports.    I have reviewed the NPO Status.   I have reviewed the Medications.     Review of Systems         Anesthesia Plan  Type of Anesthesia, risks & benefits discussed:    Anesthesia Type: Gen Supraglottic Airway  Intra-op Monitoring Plan: Standard ASA Monitors  Post Op Pain Control Plan: IV/PO Opioids PRN  Induction:  IV  Informed Consent: Informed consent signed with the Patient and all parties understand the risks and agree with anesthesia plan.  All questions answered.   ASA Score: 2    Ready For Surgery From Anesthesia Perspective.     .  NPO greater than 8 hours  Strong h/o PONV; does not want narcotics because of nausea/vomiting; h/o sore throat post anesthesia  Allergic to PCN and hydrocodone    Hct 36  Cr 1.2    PONV (postoperative nausea and vomiting)    Other Medical History   Hyperlipidemia Anxiety   Kidney stones Nephrolithiasis     Airway exam deferred (COVID precautions); adequate ROM at neck.

## 2022-11-17 NOTE — ANESTHESIA PROCEDURE NOTES
Intubation    Date/Time: 11/17/2022 9:04 AM  Performed by: Kiah Peace CRNA  Authorized by: Kiah Peace CRNA     Intubation:     Induction:  Intravenous    Intubated:  Postinduction    Mask Ventilation:  Easy with oral airway    Attempts:  1    Attempted By:  CRNA    Blade:  Harvey 2    Laryngeal View Grade: Grade I - full view of cords      Difficult Airway Encountered?: No      Complications:  None    Airway Device:  Oral endotracheal tube    Airway Device Size:  7.0    Style/Cuff Inflation:  Cuffed (inflated to minimal occlusive pressure)    Inflation Amount (mL):  6    Tube secured:  21    Secured at:  The lips    Placement Verified By:  Capnometry    Complicating Factors:  None    Findings Post-Intubation:  BS equal bilateral and atraumatic/condition of teeth unchanged

## 2022-11-17 NOTE — OR NURSING
1112 REC'D TO PACU IN STABLE COND. PT SLEEPING WAKES TO VOICE. CONNECTED TO BP CUFF, EKG LEADS & SPO2 MONITORS. VS STABLE. PT IS HURTING A LITTLE BUT DENIES WANTING AN NARCOTICS. PT HAD A UTEROSCOPE W STENT & LITHOTRIPSY. NO DISTRESS NOTED @ THIS TIME, WILL CONT TO MONITOR.      1135 DR HENRY IN DEPT, ASKED ABOUT TORADOL FOR PAIN CONTROLL. PT HAS ALREADY HAD 60 OF TORADOL & OFIRMIVE. NO NEW ORDERS @ THIS TIME.    1140 TRANSFERRED TO OUT PT ROOM 6. VS STABLE  BEDSIDE REPORT GIVEN TO AMANDA FUCHS. NO DISTRESS NOTED @ THIS TIME. VS 96% 77 125/57

## 2022-11-17 NOTE — DISCHARGE SUMMARY
Patient seen in consultation by Dr. Nelia Brooks on Sunday and had a stent insertion by me for left upper ureter stone on Monday November 7.  She also has a larger stone in her left lower pole.  Patient has done well since discharge and is in today for preliminary checkup in anticipation of left ureterorenoscopy with laser lithotripsy of the 2 stones on Thursday November 17.  Patient had a lot of stent problems initially but has done much better the last few days and has been tolerating much better.  She is agreeable to going ahead with the left ureterorenoscopy with fragmentation of stones and stent exchange on Thursday.  She has been on Bactrim DS since she went home and has been tolerating okay.    Assessment:  Left ureteral stone - Primary  Urinary tract infection with hematuria, site unspecified  Left renal stone  Hydronephrosis of left kidney  Recurrent nephrolithiasis    Plan:  Patient will be admitted to outpatient surgery on Thursday November 17th at 6:30 a.m..  She understands to be NPO except morning medication with a small amount of water.  She is concerned about nausea related to her surgery as that is usually a problem for her.  She is agreeable to going ahead with the procedure.  Informed consent obtained.  Repeat urine culture submitted.  [November 15, 2022]  ------------------------------------------------------------  The above note is from Dr. Pierre's clinic visit with Mrs. Flores on November 15, 2022.    Mrs. Flores was admitted to the outpatient surgery department of [facility] this morning. She was taken to the Cystoscopy suite where she underwent Cystoscopy, left ureterorenoscopy with laser lithotripsy, stone manipulation and basket extraction of stone fragments and left ureteral stent exchange under general endotracheal anesthesia by Dr. Pierre (please see his operative procedure note for complete details). She tolerated the procedure well and was awakened and taken to PACU in  "stable condition. After PACU, she was taken back to [facility]. She had an uneventful recovery. She has voided adequately and is now requesting to go home. String was left intact on the distal end of indwelling left ureteral stent. She was given verbal instructions on how to "pull" her ureteral stent. She will do this first thing Monday morning, November 21, 2022.    Mrs. Flores has both pain and nausea meds at home and will take as needed.    She has a post-op return appointment with Dr. Pierre on Wednesday, December 21, 2022 at 1:40 p.m.  She will have a KUB x-ray first, then see Dr. Pierre after the x-ray.    She knows to call our office if she has any problems prior to her December 21st appointment.    BRISA Dubois, CNOR,  [facility]    "

## 2022-11-17 NOTE — ANESTHESIA POSTPROCEDURE EVALUATION
Anesthesia Post Evaluation    Patient: Ami Flores    Procedure(s) Performed: Procedure(s) (LRB):  URETERORENOSCOPY WITH LASER LITHOTRIPSY AND URETERAL STENT EXCHANGE with basket extraction of stone fragments (Left)    Final Anesthesia Type: general      Patient location during evaluation: PACU  Post-procedure vital signs: reviewed and stable  Pain management: adequate  Airway patency: patent    PONV status at discharge: No PONV  Anesthetic complications: no      Cardiovascular status: hemodynamically stable  Respiratory status: unassisted  Hydration status: euvolemic  Follow-up not needed.          Vitals Value Taken Time   /57 11/17/22 1301   Temp 36.8 °C (98.3 °F) 11/17/22 1115   Pulse 83 11/17/22 1304   Resp 16 11/17/22 1300   SpO2 98 % 11/17/22 1304   Vitals shown include unvalidated device data.      Event Time   Out of Recovery 11:40:00         Pain/Bran Score: Pain Rating Prior to Med Admin: 6 (11/17/2022 12:24 PM)  Bran Score: 10 (11/17/2022 11:54 AM)  Modified Bran Score: 19 (11/17/2022 11:54 AM)

## 2022-11-17 NOTE — DISCHARGE INSTRUCTIONS
Pull string early Monday morning with first void. If you have any problems come to 's clinic.  Showers only until stent pulled.  Take pain meds as needed.  Strain all urine and bring any fragments to follow up appointment.  Remove scopalamine patch in 3 days, if you have dizziness before then it's ok to remove patch. Wash hands after handling patch.

## 2022-12-15 DIAGNOSIS — R11.2 NAUSEA AND VOMITING, UNSPECIFIED VOMITING TYPE: ICD-10-CM

## 2022-12-16 RX ORDER — ONDANSETRON 4 MG/1
TABLET, FILM COATED ORAL
Qty: 12 TABLET | Refills: 1 | OUTPATIENT
Start: 2022-12-16

## 2022-12-22 RX ORDER — FLUTICASONE PROPIONATE 0.5 MG/G
CREAM TOPICAL
Qty: 30 G | Refills: 2 | Status: SHIPPED | OUTPATIENT
Start: 2022-12-22 | End: 2023-09-19 | Stop reason: SDUPTHER

## 2023-01-09 DIAGNOSIS — F41.9 ANXIETY: Primary | ICD-10-CM

## 2023-01-09 RX ORDER — CLORAZEPATE DIPOTASSIUM 3.75 MG/1
3.75 TABLET ORAL 2 TIMES DAILY PRN
Qty: 60 TABLET | Refills: 0 | Status: SHIPPED | OUTPATIENT
Start: 2023-01-09 | End: 2023-01-23 | Stop reason: SDUPTHER

## 2023-01-19 ENCOUNTER — HOSPITAL ENCOUNTER (OUTPATIENT)
Dept: RADIOLOGY | Facility: HOSPITAL | Age: 63
Discharge: HOME | End: 2023-01-19
Attending: UROLOGY
Payer: COMMERCIAL

## 2023-01-19 ENCOUNTER — OFFICE VISIT (OUTPATIENT)
Dept: UROLOGY | Facility: CLINIC | Age: 63
End: 2023-01-19
Payer: COMMERCIAL

## 2023-01-19 VITALS
SYSTOLIC BLOOD PRESSURE: 144 MMHG | HEIGHT: 61 IN | WEIGHT: 151 LBS | DIASTOLIC BLOOD PRESSURE: 80 MMHG | BODY MASS INDEX: 28.51 KG/M2 | HEART RATE: 76 BPM

## 2023-01-19 DIAGNOSIS — N39.0 URINARY TRACT INFECTION WITHOUT HEMATURIA, SITE UNSPECIFIED: ICD-10-CM

## 2023-01-19 DIAGNOSIS — N20.0 NEPHROLITHIASIS: Primary | ICD-10-CM

## 2023-01-19 DIAGNOSIS — N20.0 KIDNEY STONES: ICD-10-CM

## 2023-01-19 DIAGNOSIS — Z09 POSTOP CHECK: ICD-10-CM

## 2023-01-19 PROCEDURE — 3079F PR MOST RECENT DIASTOLIC BLOOD PRESSURE 80-89 MM HG: ICD-10-PCS | Mod: CPTII,,, | Performed by: UROLOGY

## 2023-01-19 PROCEDURE — 99024 PR POST-OP FOLLOW-UP VISIT: ICD-10-PCS | Mod: ,,, | Performed by: UROLOGY

## 2023-01-19 PROCEDURE — 99024 POSTOP FOLLOW-UP VISIT: CPT | Mod: ,,, | Performed by: UROLOGY

## 2023-01-19 PROCEDURE — 87186 CULTURE, URINE: ICD-10-PCS | Mod: ,,, | Performed by: CLINICAL MEDICAL LABORATORY

## 2023-01-19 PROCEDURE — 1159F MED LIST DOCD IN RCRD: CPT | Mod: CPTII,,, | Performed by: UROLOGY

## 2023-01-19 PROCEDURE — 3079F DIAST BP 80-89 MM HG: CPT | Mod: CPTII,,, | Performed by: UROLOGY

## 2023-01-19 PROCEDURE — 74018 XR KUB: ICD-10-PCS | Mod: 26,,, | Performed by: RADIOLOGY

## 2023-01-19 PROCEDURE — 87086 URINE CULTURE/COLONY COUNT: CPT | Mod: ,,, | Performed by: CLINICAL MEDICAL LABORATORY

## 2023-01-19 PROCEDURE — 1160F PR REVIEW ALL MEDS BY PRESCRIBER/CLIN PHARMACIST DOCUMENTED: ICD-10-PCS | Mod: CPTII,,, | Performed by: UROLOGY

## 2023-01-19 PROCEDURE — 1159F PR MEDICATION LIST DOCUMENTED IN MEDICAL RECORD: ICD-10-PCS | Mod: CPTII,,, | Performed by: UROLOGY

## 2023-01-19 PROCEDURE — 3077F PR MOST RECENT SYSTOLIC BLOOD PRESSURE >= 140 MM HG: ICD-10-PCS | Mod: CPTII,,, | Performed by: UROLOGY

## 2023-01-19 PROCEDURE — 87077 CULTURE, URINE: ICD-10-PCS | Mod: ,,, | Performed by: CLINICAL MEDICAL LABORATORY

## 2023-01-19 PROCEDURE — 1160F RVW MEDS BY RX/DR IN RCRD: CPT | Mod: CPTII,,, | Performed by: UROLOGY

## 2023-01-19 PROCEDURE — 74018 RADEX ABDOMEN 1 VIEW: CPT | Mod: 26,,, | Performed by: RADIOLOGY

## 2023-01-19 PROCEDURE — 87077 CULTURE AEROBIC IDENTIFY: CPT | Mod: ,,, | Performed by: CLINICAL MEDICAL LABORATORY

## 2023-01-19 PROCEDURE — 3008F BODY MASS INDEX DOCD: CPT | Mod: CPTII,,, | Performed by: UROLOGY

## 2023-01-19 PROCEDURE — 74018 RADEX ABDOMEN 1 VIEW: CPT | Mod: TC

## 2023-01-19 PROCEDURE — 3008F PR BODY MASS INDEX (BMI) DOCUMENTED: ICD-10-PCS | Mod: CPTII,,, | Performed by: UROLOGY

## 2023-01-19 PROCEDURE — 3077F SYST BP >= 140 MM HG: CPT | Mod: CPTII,,, | Performed by: UROLOGY

## 2023-01-19 PROCEDURE — 87086 CULTURE, URINE: ICD-10-PCS | Mod: ,,, | Performed by: CLINICAL MEDICAL LABORATORY

## 2023-01-19 PROCEDURE — 99214 OFFICE O/P EST MOD 30 MIN: CPT | Mod: PBBFAC | Performed by: UROLOGY

## 2023-01-19 PROCEDURE — 87186 SC STD MICRODIL/AGAR DIL: CPT | Mod: ,,, | Performed by: CLINICAL MEDICAL LABORATORY

## 2023-01-20 NOTE — PROGRESS NOTES
Subjective:       Patient ID: Ami Flores is a 62 y.o. female.    Chief Complaint: Follow-up (KUB 1 month post op)    Patient seen in consultation by Dr. Nelia Brooks on Sunday and had a stent insertion by me for left upper ureter stone on Monday November 7.  She also has a larger stone in her left lower pole.  Patient has done well since discharge and is in today for preliminary checkup in anticipation of left ureterorenoscopy with laser lithotripsy of the 2 stones on Thursday November 17.  Patient had a lot of stent problems initially but has done much better the last few days and has been tolerating much better.  She is agreeable to going ahead with the left ureterorenoscopy with fragmentation of stones and stent exchange on Thursday.  She has been on Bactrim DS since she went home and has been tolerating okay.     Assessment:  Left ureteral stone - Primary  Urinary tract infection with hematuria, site unspecified  Left renal stone  Hydronephrosis of left kidney  Recurrent nephrolithiasis     Plan:  Patient will be admitted to outpatient surgery on Thursday November 17th at 6:30 a.m..  She understands to be NPO except morning medication with a small amount of water.  She is concerned about nausea related to her surgery as that is usually a problem for her.  She is agreeable to going ahead with the procedure.  Informed consent obtained.  Repeat urine culture submitted.  [November 15, 2022]  ------------------------------------------------------------  The above note is from Dr. Pierre's clinic visit with Mrs. Flores on November 15, 2022.     Mrs. Flores was admitted to the outpatient surgery department of [facility] this morning. She was taken to the Cystoscopy suite where she underwent Cystoscopy, left ureterorenoscopy with laser lithotripsy, stone manipulation and basket extraction of stone fragments and left ureteral stent exchange under general endotracheal anesthesia by Dr. Pierre (please see  "his operative procedure note for complete details). She tolerated the procedure well and was awakened and taken to PACU in stable condition. After PACU, she was taken back to [facility]. She had an uneventful recovery. She has voided adequately and is now requesting to go home. String was left intact on the distal end of indwelling left ureteral stent. She was given verbal instructions on how to "pull" her ureteral stent. She will do this first thing Monday morning, November 21, 2022.     Mrs. Flores has both pain and nausea meds at home and will take as needed.     She has a post-op return appointment with Dr. Pierre on Wednesday, December 21, 2022 at 1:40 p.m.  She will have a KUB x-ray first, then see Dr. Pierre after the x-ray.     She knows to call our office if she has any problems prior to her December 21st appointment.     Raf Chappell, [facility], CNOR,  [facility]       Electronically signed by Nathen Pierre Jr., MD at 11/17/2022  6:36 PM  ---------------------------------------------------------------------------------------------------------------------------------------------------------------------------------------------------  Ms. Flores is in for 1st visit since the above ureterorenoscopy was done.  She postponed previously scheduled appointment for some reason.  She had been doing very well.  She has had no new health issues.  Does have some urinary frequency but no dysuria.  Previous CT reviewed and KUB reviewed from today.  She feels she is doing well and there is no reason to treat the known stone remaining in the left lower pole at this time.  She understands there is some residual stone there plus she has other stones still present.  Patient had previous metabolic profile and apparently was placed on potassium citrate at 1 time.  She did not take that very long and is not interested in doing another metabolic profile.  Patient advised to increase water ingestion and decrease " animal protein in diet.  She desires no additional routine follow-up at this time.  [January 19, 2023]    Review of Systems      Objective:      Physical Exam  Constitutional:       Appearance: Normal appearance. She is normal weight.   Neurological:      Mental Status: She is alert.   Psychiatric:         Mood and Affect: Mood normal.         Behavior: Behavior normal.     Urinalysis revealed a few pus cells and occasional red cells.  The dipstick had 2+ leukocytes with pH 5 and gravity 1.010  Assessment:       Problem List Items Addressed This Visit    None  Visit Diagnoses       Nephrolithiasis    -  Primary    Urinary tract infection without hematuria, site unspecified        Relevant Orders    Urine culture    Postop check                  Plan:           Reviewed imaging with patient.  Urine sent for culture to make sure there is no active infection.  We will treat if there is.  Patient will try to increase water intake and decrease animal protein in diet.  I will see again on p.r.n. basis.

## 2023-01-20 NOTE — PATIENT INSTRUCTIONS
Reviewed imaging with patient.  Urine sent for culture to make sure there is no active infection.  We will treat if there is.  Patient will try to increase water intake and decrease animal protein in diet.  I will see again on p.r.n. basis.

## 2023-01-21 LAB — UA COMPLETE W REFLEX CULTURE PNL UR: ABNORMAL

## 2023-01-23 ENCOUNTER — OFFICE VISIT (OUTPATIENT)
Dept: FAMILY MEDICINE | Facility: CLINIC | Age: 63
End: 2023-01-23
Payer: COMMERCIAL

## 2023-01-23 VITALS
WEIGHT: 152.19 LBS | HEIGHT: 61 IN | DIASTOLIC BLOOD PRESSURE: 78 MMHG | HEART RATE: 74 BPM | TEMPERATURE: 98 F | SYSTOLIC BLOOD PRESSURE: 134 MMHG | BODY MASS INDEX: 28.73 KG/M2 | OXYGEN SATURATION: 99 % | RESPIRATION RATE: 16 BRPM

## 2023-01-23 DIAGNOSIS — Z79.899 OTHER LONG TERM (CURRENT) DRUG THERAPY: ICD-10-CM

## 2023-01-23 DIAGNOSIS — F41.9 ANXIETY: Primary | ICD-10-CM

## 2023-01-23 DIAGNOSIS — N39.0 URINARY TRACT INFECTION WITHOUT HEMATURIA, SITE UNSPECIFIED: Primary | ICD-10-CM

## 2023-01-23 DIAGNOSIS — E55.9 VITAMIN D DEFICIENCY: ICD-10-CM

## 2023-01-23 DIAGNOSIS — K21.9 GASTROESOPHAGEAL REFLUX DISEASE, UNSPECIFIED WHETHER ESOPHAGITIS PRESENT: ICD-10-CM

## 2023-01-23 DIAGNOSIS — E78.2 MIXED HYPERLIPIDEMIA: ICD-10-CM

## 2023-01-23 DIAGNOSIS — M25.50 ARTHRALGIA, UNSPECIFIED JOINT: ICD-10-CM

## 2023-01-23 PROBLEM — J01.40 ACUTE NON-RECURRENT PANSINUSITIS: Status: RESOLVED | Noted: 2022-10-21 | Resolved: 2023-01-23

## 2023-01-23 LAB
CTP QC/QA: YES
POC (AMP) AMPHETAMINE: NEGATIVE
POC (BAR) BARBITURATES: NEGATIVE
POC (BUP) BUPRENORPHINE: NEGATIVE
POC (BZO) BENZODIAZEPINES: ABNORMAL
POC (COC) COCAINE: NEGATIVE
POC (MDMA) METHYLENEDIOXYMETHAMPHETAMINE 3,4: NEGATIVE
POC (MET) METHAMPHETAMINE: NEGATIVE
POC (MOP) OPIATES: NEGATIVE
POC (MTD) METHADONE: NEGATIVE
POC (OXY) OXYCODONE: NEGATIVE
POC (PCP) PHENCYCLIDINE: NEGATIVE
POC (TCA) TRICYCLIC ANTIDEPRESSANTS: NEGATIVE
POC TEMPERATURE (URINE): ABNORMAL
POC THC: NEGATIVE

## 2023-01-23 PROCEDURE — 3075F SYST BP GE 130 - 139MM HG: CPT | Mod: CPTII,,, | Performed by: NURSE PRACTITIONER

## 2023-01-23 PROCEDURE — 3008F PR BODY MASS INDEX (BMI) DOCUMENTED: ICD-10-PCS | Mod: CPTII,,, | Performed by: NURSE PRACTITIONER

## 2023-01-23 PROCEDURE — 99213 PR OFFICE/OUTPT VISIT, EST, LEVL III, 20-29 MIN: ICD-10-PCS | Mod: ,,, | Performed by: NURSE PRACTITIONER

## 2023-01-23 PROCEDURE — 3078F PR MOST RECENT DIASTOLIC BLOOD PRESSURE < 80 MM HG: ICD-10-PCS | Mod: CPTII,,, | Performed by: NURSE PRACTITIONER

## 2023-01-23 PROCEDURE — 3078F DIAST BP <80 MM HG: CPT | Mod: CPTII,,, | Performed by: NURSE PRACTITIONER

## 2023-01-23 PROCEDURE — 1159F MED LIST DOCD IN RCRD: CPT | Mod: CPTII,,, | Performed by: NURSE PRACTITIONER

## 2023-01-23 PROCEDURE — 3075F PR MOST RECENT SYSTOLIC BLOOD PRESS GE 130-139MM HG: ICD-10-PCS | Mod: CPTII,,, | Performed by: NURSE PRACTITIONER

## 2023-01-23 PROCEDURE — 3008F BODY MASS INDEX DOCD: CPT | Mod: CPTII,,, | Performed by: NURSE PRACTITIONER

## 2023-01-23 PROCEDURE — 80305 DRUG TEST PRSMV DIR OPT OBS: CPT | Mod: QW,,, | Performed by: NURSE PRACTITIONER

## 2023-01-23 PROCEDURE — 80305 POCT URINE DRUG SCREEN PRESUMP: ICD-10-PCS | Mod: QW,,, | Performed by: NURSE PRACTITIONER

## 2023-01-23 PROCEDURE — 99213 OFFICE O/P EST LOW 20 MIN: CPT | Mod: ,,, | Performed by: NURSE PRACTITIONER

## 2023-01-23 PROCEDURE — 1159F PR MEDICATION LIST DOCUMENTED IN MEDICAL RECORD: ICD-10-PCS | Mod: CPTII,,, | Performed by: NURSE PRACTITIONER

## 2023-01-23 RX ORDER — CLORAZEPATE DIPOTASSIUM 3.75 MG/1
3.75 TABLET ORAL 2 TIMES DAILY PRN
Qty: 60 TABLET | Refills: 0 | Status: SHIPPED | OUTPATIENT
Start: 2023-01-23 | End: 2023-02-22

## 2023-01-23 RX ORDER — LOVASTATIN 10 MG/1
TABLET ORAL
Qty: 30 TABLET | Refills: 2 | Status: SHIPPED | OUTPATIENT
Start: 2023-01-23 | End: 2023-05-03

## 2023-01-23 RX ORDER — CLORAZEPATE DIPOTASSIUM 3.75 MG/1
3.75 TABLET ORAL 2 TIMES DAILY PRN
Qty: 60 TABLET | Refills: 0 | Status: SHIPPED | OUTPATIENT
Start: 2023-01-23 | End: 2023-09-19 | Stop reason: SDUPTHER

## 2023-01-23 RX ORDER — CIPROFLOXACIN 500 MG/1
500 TABLET ORAL EVERY 12 HOURS
Qty: 20 TABLET | Refills: 0 | Status: SHIPPED | OUTPATIENT
Start: 2023-01-23 | End: 2023-02-02

## 2023-01-23 RX ORDER — NAPROXEN 500 MG/1
500 TABLET ORAL 2 TIMES DAILY PRN
Qty: 60 TABLET | Refills: 2 | Status: SHIPPED | OUTPATIENT
Start: 2023-01-23 | End: 2023-09-19 | Stop reason: SDUPTHER

## 2023-01-23 RX ORDER — ERGOCALCIFEROL 1.25 MG/1
50000 CAPSULE ORAL
Qty: 12 CAPSULE | Refills: 1 | Status: SHIPPED | OUTPATIENT
Start: 2023-01-23 | End: 2023-09-19 | Stop reason: SDUPTHER

## 2023-01-23 RX ORDER — LANSOPRAZOLE 30 MG/1
CAPSULE, DELAYED RELEASE ORAL
Qty: 30 CAPSULE | Refills: 3 | Status: SHIPPED | OUTPATIENT
Start: 2023-01-23 | End: 2023-09-19 | Stop reason: SDUPTHER

## 2023-01-23 RX ORDER — TESTOSTERONE MICRONIZED 100 %
POWDER (GRAM) MISCELLANEOUS
COMMUNITY
Start: 2023-01-03 | End: 2023-09-19

## 2023-01-23 RX ORDER — KETOROLAC TROMETHAMINE 10 MG/1
10 TABLET, FILM COATED ORAL EVERY 6 HOURS PRN
Qty: 20 TABLET | Refills: 0 | Status: SHIPPED | OUTPATIENT
Start: 2023-01-23 | End: 2023-01-28

## 2023-01-23 NOTE — ASSESSMENT & PLAN NOTE
Lab Results   Component Value Date    CHOL 181 09/16/2022    CHOL 189 10/20/2021    CHOL 256 10/13/2020     Lab Results   Component Value Date    HDL 74 (H) 09/16/2022    HDL 77 (H) 10/20/2021    HDL 73 10/13/2020     Lab Results   Component Value Date    LDLCALC 92 09/16/2022    LDLCALC 98 10/20/2021    LDLCALC 151 10/13/2020     Lab Results   Component Value Date    TRIG 76 09/16/2022    TRIG 68 10/20/2021    TRIG 159 10/13/2020     Lab Results   Component Value Date    CHOLHDL 2.4 09/16/2022    CHOLHDL 2.5 10/20/2021    CHOLHDL 3.5 10/13/2020   Patient is non fasting at today's appt. Will obtain labs at next visit. Continue Lovastatin at current dosage and low fat/low cholesterol diet.

## 2023-01-23 NOTE — TELEPHONE ENCOUNTER
I called and spoke to Mrs. Flores to notify her of her positive urine culture. Urine grew 50,000 colonies of gram positive cocci. Isolate grew: Enterococcus Faecalis sensitive to Cipro. Dr. Pierre has prescribed Cipro (Ciprofloxacin) 500 mg, # 20 with no refill. Mrs. Flores will take 1 tablet by mouth twice daily for 10 days. This prescription will be e-scripted to [facility] in [place], [place] at Mrs. Flores's request.

## 2023-01-23 NOTE — ASSESSMENT & PLAN NOTE
Tranxene BID as needed.   UDS performed today and is consistent with use.  checked and is OK. No signs of aberrant behavior. Instructed patient to only take medication as instructed and never share medications with others.

## 2023-01-23 NOTE — PROGRESS NOTES
Suyapa Lott NP   [facility]  [address]  [place]  [address]      PATIENT NAME: Ami Flores  : 1960  DATE: 23  MRN: [id number]      Billing Provider: Suyapa Lott NP  Level of Service: [place] OFFICE/OUTPT VISIT, EST, LEVL III, 20-29 MIN  Patient PCP Information       Provider PCP Type    Suyapa Lott NP General            Reason for Visit / Chief Complaint: Follow-up, Anxiety (Follow-up and refills), Hyperlipidemia (Follow-up and refills. ), and Nephrolithiasis (Has several kidney stones. She is being followed by Dr. Pierre and has had a couple surgeries in November on the  for the first stent and on IV antibiotics for kidney infection and then another stent and lasered stones on . She saw him for follow-up last Thursday and she has another kidney infection. He sent her in cipro. Reports stones are passable but one is going to be a tough one. She is wanting to know if she can have toradol sent in for when she starts passing them. )       Update PCP  Update Chief Complaint         History of Present Illness / Problem Focused Workflow     Ami Flores presents to the clinic with Follow-up, Anxiety (Follow-up and refills), Hyperlipidemia (Follow-up and refills. ), and Nephrolithiasis (Has several kidney stones. She is being followed by Dr. Pierre and has had a couple surgeries in November on the  for the first stent and on IV antibiotics for kidney infection and then another stent and lasered stones on . She saw him for follow-up last Thursday and she has another kidney infection. He sent her in cipro. Reports stones are passable but one is going to be a tough one. She is wanting to know if she can have toradol sent in for when she starts passing them. )     62 year old female presents to clinic for check up and refills. She has PMH of Anxiety, Bronchitis, Hyperlipidemia, Hypokalemia, and GERD. She also has history of kidney stones and underwent  lithotripsy and urethral stent placement back in November. She followed up with Dr Pierre last week and states she is doing well. She does have some remaining stones that Dr Pierre reporting she will most likely pass at some point but should be able to pass on her own. He also told her she had a UTI and placed her on Cipro which she is to  today and start. She is requesting some Toradol be sent in to have on hand when she passes stones.       Review of Systems     @Review of Systems   Constitutional:  Negative for activity change, appetite change, fatigue, fever and unexpected weight change.   HENT:  Negative for nasal congestion, ear pain, hearing loss, rhinorrhea, sinus pressure/congestion, sore throat and trouble swallowing.    Eyes:  Negative for pain, discharge, redness, visual disturbance and eye dryness.   Respiratory:  Negative for cough, chest tightness, shortness of breath and wheezing.    Cardiovascular:  Negative for chest pain, palpitations and leg swelling.   Gastrointestinal:  Negative for abdominal distention, abdominal pain, blood in stool, constipation, diarrhea and vomiting.   Endocrine: Positive for polyuria. Negative for cold intolerance, heat intolerance and polydipsia.   Genitourinary:  Negative for bladder incontinence, difficulty urinating, dysuria, frequency, hematuria, menstrual problem and urgency.   Musculoskeletal:  Positive for arthralgias. Negative for gait problem, joint swelling, myalgias and neck pain.   Integumentary:  Negative for color change, rash and wound.   Allergic/Immunologic: Negative for environmental allergies and food allergies.   Neurological:  Negative for dizziness, weakness, light-headedness and headaches.   Psychiatric/Behavioral:  Negative for behavioral problems, confusion, dysphoric mood and sleep disturbance.      Medical / Social / Family History     Past Medical History:   Diagnosis Date    Anxiety     Hyperlipidemia     Kidney stones      Nephrolithiasis     since she was 13 years.  She has always passed the stones.  No procedures ever required until 2022    PONV (postoperative nausea and vomiting)        Past Surgical History:   Procedure Laterality Date    ADENOIDECTOMY  1967    6 years old    APPENDECTOMY  1971    10 years old     SECTION  08/12/1983    x2 1985    CHOLECYSTECTOMY  2005    CYSTOSCOPY W/ URETERAL STENT PLACEMENT Left 2022    Procedure: CYSTOSCOPY, WITH URETERAL STENT INSERTION;  Surgeon: Nathen Pierre Jr., MD;  Location: [facility];  Service: Urology;  Laterality: Left;    ECTOPIC PREGNANCY SURGERY  1984    left foot Left 1972    heel    TONSILLECTOMY  1967    6 years old    URETERAL STENT PLACEMENT  2022        URETERORENOSCOPY Left 2022    Procedure: URETERORENOSCOPY WITH LASER LITHOTRIPSY AND URETERAL STENT EXCHANGE with basket extraction of stone fragments;  Surgeon: Nathen Pierre Jr., MD;  Location: [facility];  Service: Urology;  Laterality: Left;       Social History  Ms.  reports that she has never smoked. She has never been exposed to tobacco smoke. She has never used smokeless tobacco. She reports that she does not drink alcohol and does not use drugs.    Family History  Ms.'s family history includes Diabetes in her paternal grandmother and son; No Known Problems in her daughter, maternal grandfather, maternal grandmother, paternal grandfather, and sister; Stroke in her father; Thyroid disease in her mother; Ulcerative colitis in her son.    Medications and Allergies     Medications  Outpatient Medications Marked as Taking for the 23 encounter (Office Visit) with Suyapa Lott NP   Medication Sig Dispense Refill    ciprofloxacin HCl (CIPRO) 500 MG tablet Take 1 tablet (500 mg total) by mouth every 12 (twelve) hours. for 10 days 20 tablet 0    fluticasone propionate (CUTIVATE) 0.05 % cream APPLY TO THE AFFECTED AREA(S) TWICE DAILY 30 g 2     testosterone micronized, bulk, 100 % Powd Part of compound medication for hormones. Takes every night      [DISCONTINUED] clorazepate (TRANXENE) 3.75 MG Tab Take 1 tablet (3.75 mg total) by mouth 2 (two) times daily as needed (anxiety). 60 tablet 0    [DISCONTINUED] ergocalciferol (ERGOCALCIFEROL) 50,000 unit Cap Take 1 capsule (50,000 Units total) by mouth every 7 days. 12 capsule 1    [DISCONTINUED] lansoprazole (PREVACID) 30 MG capsule TAKE ONE CAPSULE BY MOUTH EVERY DAY BEFORE a meal      [DISCONTINUED] lovastatin (MEVACOR) 10 MG tablet TAKE ONE TABLET BY MOUTH EVERY EVENING WITH meal 30 tablet 2    [DISCONTINUED] naproxen (NAPROSYN) 500 MG tablet Take 500 mg by mouth 2 (two) times daily as needed.         Allergies  Review of patient's allergies indicates:   Allergen Reactions    Hydrocodone Nausea And Vomiting    Penicillins Nausea Only       Physical Examination     Vitals:    01/23/23 1417   BP: 134/78   Pulse: 74   Resp: 16   Temp: 97.5 °F (36.4 °C)     Physical Exam  Vitals and nursing note reviewed.   HENT:      Head: Normocephalic.      Right Ear: Tympanic membrane normal.      Left Ear: Tympanic membrane normal.      Nose: Nose normal.      Mouth/Throat:      Mouth: Mucous membranes are moist.      Pharynx: Oropharynx is clear. No posterior oropharyngeal erythema.   Eyes:      Conjunctiva/sclera: Conjunctivae normal.   Cardiovascular:      Rate and Rhythm: Normal rate and regular rhythm.      Pulses: Normal pulses.      Heart sounds: Normal heart sounds.   Pulmonary:      Effort: Pulmonary effort is normal.      Breath sounds: Normal breath sounds.   Abdominal:      General: Abdomen is flat. Bowel sounds are normal. There is no distension.      Palpations: Abdomen is soft.   Musculoskeletal:         General: No swelling or tenderness. Normal range of motion.      Cervical back: Normal range of motion.      Right lower leg: No edema.      Left lower leg: No edema.   Skin:     General: Skin is warm  and dry.      Capillary Refill: Capillary refill takes less than 2 seconds.   Neurological:      Mental Status: She is alert. Mental status is at baseline.   Psychiatric:         Mood and Affect: Mood normal.         Behavior: Behavior normal.             Lab Results   Component Value Date    WBC 13.71 (H) 11/08/2022    HGB 11.7 (L) 11/08/2022    HCT 36.0 (L) 11/08/2022    MCV 86.1 11/08/2022     11/08/2022          Sodium   Date Value Ref Range Status   11/08/2022 140 136 - 145 mmol/L Final     Potassium   Date Value Ref Range Status   11/08/2022 3.7 3.5 - 5.1 mmol/L Final     Chloride   Date Value Ref Range Status   11/08/2022 105 98 - 107 mmol/L Final     CO2   Date Value Ref Range Status   11/08/2022 22 21 - 32 mmol/L Final     Glucose   Date Value Ref Range Status   11/08/2022 175 (H) 74 - 106 mg/dL Final     BUN   Date Value Ref Range Status   11/08/2022 15 7 - 18 mg/dL Final     Creatinine   Date Value Ref Range Status   11/08/2022 1.22 (H) 0.55 - 1.02 mg/dL Final     Calcium   Date Value Ref Range Status   11/08/2022 8.6 8.5 - 10.1 mg/dL Final     Total Protein   Date Value Ref Range Status   11/05/2022 6.6 6.4 - 8.2 g/dL Final     Albumin   Date Value Ref Range Status   11/05/2022 2.9 (L) 3.5 - 5.0 g/dL Final     Bilirubin, Total   Date Value Ref Range Status   11/05/2022 1.4 (H) >0.0 - 1.2 mg/dL Final     Alk Phos   Date Value Ref Range Status   11/05/2022 158 (H) 50 - 130 U/L Final     AST   Date Value Ref Range Status   11/05/2022 93 (H) 15 - 37 U/L Final     ALT   Date Value Ref Range Status   11/05/2022 122 (H) 13 - 56 U/L Final     Anion Gap   Date Value Ref Range Status   11/08/2022 17 (H) 7 - 16 mmol/L Final      X-Ray KUB  Narrative: EXAMINATION:  XR KUB    CLINICAL HISTORY:  follow up kidney stones post op;Calculus of kidney    TECHNIQUE:  Single AP supine view of the abdomen (KUB) was performed    COMPARISON:  CT 11/05/2022    FINDINGS:  The stones previously seen over the right and left  renal shadow are not detectable on this radiograph.  Moderate colonic stool may be the culprit for obscuring the renal stones.  No bowel obstruction.  Impression: As above.    Electronically signed by: Jb Fernández  Date:    01/19/2023  Time:    16:18     Procedures   Assessment and Plan (including Health Maintenance)      Problem List  Smart Sets  Document Outside HM   :    Plan:           Problem List Items Addressed This Visit          Psychiatric    Anxiety - Primary    Current Assessment & Plan     Tranxene BID as needed.   UDS performed today and is consistent with use.  checked and is OK. No signs of aberrant behavior. Instructed patient to only take medication as instructed and never share medications with others.            Relevant Medications    clorazepate (TRANXENE) 3.75 MG Tab    clorazepate (TRANXENE) 3.75 MG Tab    clorazepate (TRANXENE) 3.75 MG Tab    Other Relevant Orders    POCT Urine Drug Screen Presump (Completed)       Cardiac/Vascular    Hyperlipidemia    Current Assessment & Plan     Lab Results   Component Value Date    CHOL 181 09/16/2022    CHOL 189 10/20/2021    CHOL 256 10/13/2020     Lab Results   Component Value Date    HDL 74 (H) 09/16/2022    HDL 77 (H) 10/20/2021    HDL 73 10/13/2020     Lab Results   Component Value Date    LDLCALC 92 09/16/2022    LDLCALC 98 10/20/2021    LDLCALC 151 10/13/2020     Lab Results   Component Value Date    TRIG 76 09/16/2022    TRIG 68 10/20/2021    TRIG 159 10/13/2020     Lab Results   Component Value Date    CHOLHDL 2.4 09/16/2022    CHOLHDL 2.5 10/20/2021    CHOLHDL 3.5 10/13/2020   Patient is non fasting at today's appt. Will obtain labs at next visit. Continue Lovastatin at current dosage and low fat/low cholesterol diet.            Relevant Medications    lovastatin (MEVACOR) 10 MG tablet       Endocrine    Vitamin D deficiency    Current Assessment & Plan     Continue Ergocalciferol weekly as ordered.            Relevant Medications     ergocalciferol (ERGOCALCIFEROL) 50,000 unit Cap       GI    GERD (gastroesophageal reflux disease)    Current Assessment & Plan     Well controlled on Prevacid. Continue at current dosage. Follow up in 3 months or as needed.          Relevant Medications    lansoprazole (PREVACID) 30 MG capsule       Orthopedic    Arthralgia    Current Assessment & Plan     Continue Naproxen as needed. Follow up in 3 months or as needed.          Relevant Medications    naproxen (NAPROSYN) 500 MG tablet     Other Visit Diagnoses       Other long term (current) drug therapy        Relevant Orders    POCT Urine Drug Screen Presump (Completed)            Health Maintenance Topics with due status: Not Due       Topic Last Completion Date    Cervical Cancer Screening 03/31/2022    Mammogram 04/05/2022    Lipid Panel 09/16/2022       Future Appointments   Date Time Provider Department Center   9/18/2023 10:00 AM [facility]        Health Maintenance Due   Topic Date Due    TETANUS VACCINE  Never done    Shingles Vaccine (1 of 2) Never done        No follow-ups on file.     Signature:  Suyapa Lott NP  [address] [place]  [address]    Date of encounter: 1/23/23

## 2023-05-03 DIAGNOSIS — E78.2 MIXED HYPERLIPIDEMIA: ICD-10-CM

## 2023-05-03 RX ORDER — LOVASTATIN 10 MG/1
TABLET ORAL
Qty: 30 TABLET | Refills: 2 | Status: SHIPPED | OUTPATIENT
Start: 2023-05-03 | End: 2023-09-19 | Stop reason: SDUPTHER

## 2023-08-31 DIAGNOSIS — E55.9 VITAMIN D DEFICIENCY: ICD-10-CM

## 2023-08-31 DIAGNOSIS — E78.2 MIXED HYPERLIPIDEMIA: ICD-10-CM

## 2023-09-14 RX ORDER — LOVASTATIN 10 MG/1
TABLET ORAL
Qty: 30 TABLET | Refills: 2 | OUTPATIENT
Start: 2023-09-14

## 2023-09-14 RX ORDER — ERGOCALCIFEROL 1.25 MG/1
CAPSULE ORAL
Qty: 12 CAPSULE | Refills: 1 | OUTPATIENT
Start: 2023-09-14

## 2023-09-19 ENCOUNTER — OFFICE VISIT (OUTPATIENT)
Dept: FAMILY MEDICINE | Facility: CLINIC | Age: 63
End: 2023-09-19
Payer: COMMERCIAL

## 2023-09-19 VITALS
DIASTOLIC BLOOD PRESSURE: 82 MMHG | SYSTOLIC BLOOD PRESSURE: 129 MMHG | OXYGEN SATURATION: 97 % | HEIGHT: 61 IN | BODY MASS INDEX: 28.89 KG/M2 | WEIGHT: 153 LBS | HEART RATE: 69 BPM | TEMPERATURE: 98 F | RESPIRATION RATE: 18 BRPM

## 2023-09-19 DIAGNOSIS — K21.9 GASTROESOPHAGEAL REFLUX DISEASE, UNSPECIFIED WHETHER ESOPHAGITIS PRESENT: ICD-10-CM

## 2023-09-19 DIAGNOSIS — E55.9 VITAMIN D DEFICIENCY: ICD-10-CM

## 2023-09-19 DIAGNOSIS — Z79.899 LONG-TERM USE OF HIGH-RISK MEDICATION: Primary | ICD-10-CM

## 2023-09-19 DIAGNOSIS — Z13.1 SCREENING FOR DIABETES MELLITUS (DM): ICD-10-CM

## 2023-09-19 DIAGNOSIS — E78.2 MIXED HYPERLIPIDEMIA: ICD-10-CM

## 2023-09-19 DIAGNOSIS — M25.50 ARTHRALGIA, UNSPECIFIED JOINT: ICD-10-CM

## 2023-09-19 DIAGNOSIS — F41.9 ANXIETY: ICD-10-CM

## 2023-09-19 LAB
ALBUMIN SERPL BCP-MCNC: 3.8 G/DL (ref 3.5–5)
ALBUMIN/GLOB SERPL: 1.2 {RATIO}
ALP SERPL-CCNC: 109 U/L (ref 50–130)
ALT SERPL W P-5'-P-CCNC: 24 U/L (ref 13–56)
ANION GAP SERPL CALCULATED.3IONS-SCNC: 11 MMOL/L (ref 7–16)
AST SERPL W P-5'-P-CCNC: 21 U/L (ref 15–37)
BASOPHILS # BLD AUTO: 0.05 K/UL (ref 0–0.2)
BASOPHILS NFR BLD AUTO: 0.7 % (ref 0–1)
BILIRUB SERPL-MCNC: 0.3 MG/DL (ref ?–1.2)
BUN SERPL-MCNC: 18 MG/DL (ref 7–18)
BUN/CREAT SERPL: 20 (ref 6–20)
CALCIUM SERPL-MCNC: 8.7 MG/DL (ref 8.5–10.1)
CHLORIDE SERPL-SCNC: 108 MMOL/L (ref 98–107)
CHOLEST SERPL-MCNC: 206 MG/DL (ref 0–200)
CHOLEST/HDLC SERPL: 2.7 {RATIO}
CO2 SERPL-SCNC: 26 MMOL/L (ref 21–32)
CREAT SERPL-MCNC: 0.9 MG/DL (ref 0.55–1.02)
CTP QC/QA: YES
DIFFERENTIAL METHOD BLD: ABNORMAL
EGFR (NO RACE VARIABLE) (RUSH/TITUS): 72 ML/MIN/1.73M2
EOSINOPHIL # BLD AUTO: 0.08 K/UL (ref 0–0.5)
EOSINOPHIL NFR BLD AUTO: 1.2 % (ref 1–4)
ERYTHROCYTE [DISTWIDTH] IN BLOOD BY AUTOMATED COUNT: 16 % (ref 11.5–14.5)
EST. AVERAGE GLUCOSE BLD GHB EST-MCNC: 100 MG/DL
GLOBULIN SER-MCNC: 3.3 G/DL (ref 2–4)
GLUCOSE SERPL-MCNC: 88 MG/DL (ref 74–106)
HBA1C MFR BLD HPLC: 5.6 % (ref 4.5–6.6)
HCT VFR BLD AUTO: 39.3 % (ref 38–47)
HDLC SERPL-MCNC: 75 MG/DL (ref 40–60)
HGB BLD-MCNC: 12.3 G/DL (ref 12–16)
IMM GRANULOCYTES # BLD AUTO: 0.01 K/UL (ref 0–0.04)
IMM GRANULOCYTES NFR BLD: 0.1 % (ref 0–0.4)
LDLC SERPL CALC-MCNC: 102 MG/DL
LDLC/HDLC SERPL: 1.4 {RATIO}
LYMPHOCYTES # BLD AUTO: 2.17 K/UL (ref 1–4.8)
LYMPHOCYTES NFR BLD AUTO: 31.6 % (ref 27–41)
MCH RBC QN AUTO: 27.8 PG (ref 27–31)
MCHC RBC AUTO-ENTMCNC: 31.3 G/DL (ref 32–36)
MCV RBC AUTO: 88.9 FL (ref 80–96)
MONOCYTES # BLD AUTO: 0.26 K/UL (ref 0–0.8)
MONOCYTES NFR BLD AUTO: 3.8 % (ref 2–6)
MPC BLD CALC-MCNC: 10.8 FL (ref 9.4–12.4)
NEUTROPHILS # BLD AUTO: 4.29 K/UL (ref 1.8–7.7)
NEUTROPHILS NFR BLD AUTO: 62.6 % (ref 53–65)
NONHDLC SERPL-MCNC: 131 MG/DL
NRBC # BLD AUTO: 0 X10E3/UL
NRBC, AUTO (.00): 0 %
PLATELET # BLD AUTO: 274 K/UL (ref 150–400)
POC (AMP) AMPHETAMINE: NEGATIVE
POC (BAR) BARBITURATES: NEGATIVE
POC (BUP) BUPRENORPHINE: NEGATIVE
POC (BZO) BENZODIAZEPINES: ABNORMAL
POC (COC) COCAINE: NEGATIVE
POC (MDMA) METHYLENEDIOXYMETHAMPHETAMINE 3,4: NEGATIVE
POC (MET) METHAMPHETAMINE: NEGATIVE
POC (MOP) OPIATES: NEGATIVE
POC (MTD) METHADONE: NEGATIVE
POC (OXY) OXYCODONE: NEGATIVE
POC (PCP) PHENCYCLIDINE: NEGATIVE
POC (TCA) TRICYCLIC ANTIDEPRESSANTS: NEGATIVE
POC TEMPERATURE (URINE): 94
POC THC: NEGATIVE
POTASSIUM SERPL-SCNC: 4.2 MMOL/L (ref 3.5–5.1)
PROT SERPL-MCNC: 7.1 G/DL (ref 6.4–8.2)
RBC # BLD AUTO: 4.42 M/UL (ref 4.2–5.4)
SODIUM SERPL-SCNC: 141 MMOL/L (ref 136–145)
TRIGL SERPL-MCNC: 145 MG/DL (ref 35–150)
TSH SERPL DL<=0.005 MIU/L-ACNC: 3.62 UIU/ML (ref 0.36–3.74)
VLDLC SERPL-MCNC: 29 MG/DL
WBC # BLD AUTO: 6.86 K/UL (ref 4.5–11)

## 2023-09-19 PROCEDURE — 83036 HEMOGLOBIN A1C: ICD-10-PCS | Mod: ,,, | Performed by: CLINICAL MEDICAL LABORATORY

## 2023-09-19 PROCEDURE — 3079F PR MOST RECENT DIASTOLIC BLOOD PRESSURE 80-89 MM HG: ICD-10-PCS | Mod: CPTII,,,

## 2023-09-19 PROCEDURE — 3074F PR MOST RECENT SYSTOLIC BLOOD PRESSURE < 130 MM HG: ICD-10-PCS | Mod: CPTII,,,

## 2023-09-19 PROCEDURE — 80061 LIPID PANEL: CPT | Mod: ,,, | Performed by: CLINICAL MEDICAL LABORATORY

## 2023-09-19 PROCEDURE — 3008F PR BODY MASS INDEX (BMI) DOCUMENTED: ICD-10-PCS | Mod: CPTII,,,

## 2023-09-19 PROCEDURE — 3044F PR MOST RECENT HEMOGLOBIN A1C LEVEL <7.0%: ICD-10-PCS | Mod: CPTII,,,

## 2023-09-19 PROCEDURE — 99214 PR OFFICE/OUTPT VISIT, EST, LEVL IV, 30-39 MIN: ICD-10-PCS | Mod: ,,,

## 2023-09-19 PROCEDURE — 80061 LIPID PANEL: ICD-10-PCS | Mod: ,,, | Performed by: CLINICAL MEDICAL LABORATORY

## 2023-09-19 PROCEDURE — 3008F BODY MASS INDEX DOCD: CPT | Mod: CPTII,,,

## 2023-09-19 PROCEDURE — 80050 PR  GENERAL HEALTH PANEL: ICD-10-PCS | Mod: ,,, | Performed by: CLINICAL MEDICAL LABORATORY

## 2023-09-19 PROCEDURE — 80050 GENERAL HEALTH PANEL: CPT | Mod: ,,, | Performed by: CLINICAL MEDICAL LABORATORY

## 2023-09-19 PROCEDURE — 83036 HEMOGLOBIN GLYCOSYLATED A1C: CPT | Mod: ,,, | Performed by: CLINICAL MEDICAL LABORATORY

## 2023-09-19 PROCEDURE — 80305 POCT URINE DRUG SCREEN PRESUMP: ICD-10-PCS | Mod: QW,,,

## 2023-09-19 PROCEDURE — 99214 OFFICE O/P EST MOD 30 MIN: CPT | Mod: ,,,

## 2023-09-19 PROCEDURE — 3074F SYST BP LT 130 MM HG: CPT | Mod: CPTII,,,

## 2023-09-19 PROCEDURE — 3079F DIAST BP 80-89 MM HG: CPT | Mod: CPTII,,,

## 2023-09-19 PROCEDURE — 3044F HG A1C LEVEL LT 7.0%: CPT | Mod: CPTII,,,

## 2023-09-19 PROCEDURE — 80305 DRUG TEST PRSMV DIR OPT OBS: CPT | Mod: QW,,,

## 2023-09-19 PROCEDURE — 36415 COLL VENOUS BLD VENIPUNCTURE: CPT

## 2023-09-19 RX ORDER — LOVASTATIN 10 MG/1
TABLET ORAL
Qty: 30 TABLET | Refills: 2 | Status: SHIPPED | OUTPATIENT
Start: 2023-09-19

## 2023-09-19 RX ORDER — ERGOCALCIFEROL 1.25 MG/1
50000 CAPSULE ORAL
Qty: 12 CAPSULE | Refills: 1 | Status: SHIPPED | OUTPATIENT
Start: 2023-09-19

## 2023-09-19 RX ORDER — FLUTICASONE PROPIONATE 0.5 MG/G
CREAM TOPICAL
Qty: 30 G | Refills: 2 | Status: SHIPPED | OUTPATIENT
Start: 2023-09-19

## 2023-09-19 RX ORDER — LANSOPRAZOLE 30 MG/1
CAPSULE, DELAYED RELEASE ORAL
Qty: 30 CAPSULE | Refills: 3 | Status: SHIPPED | OUTPATIENT
Start: 2023-09-19

## 2023-09-19 RX ORDER — CLORAZEPATE DIPOTASSIUM 3.75 MG/1
3.75 TABLET ORAL 2 TIMES DAILY PRN
Qty: 60 TABLET | Refills: 0 | Status: SHIPPED | OUTPATIENT
Start: 2023-09-19 | End: 2023-11-14 | Stop reason: SDUPTHER

## 2023-09-19 RX ORDER — NAPROXEN 500 MG/1
500 TABLET ORAL 2 TIMES DAILY PRN
Qty: 60 TABLET | Refills: 2 | Status: SHIPPED | OUTPATIENT
Start: 2023-09-19

## 2023-09-19 NOTE — PROGRESS NOTES
ESVIN JOHNSON   [facility]  [address]  [place]  [address]      PATIENT NAME: Ami Flores  : 1960  DATE: 23  MRN: [id number]      Billing Provider: ESVIN JOHNSON  Level of Service: ND OFFICE/OUTPT VISIT, EST, LEVL IV, 30-39 MIN  Patient PCP Information       Provider PCP Type    Suyapa Lott NP General            Reason for Visit / Chief Complaint: Annual Exam (Pt is here for [facility] wellness. )         History of Present Illness / Problem Focused Workflow     Ami Flores presents to the clinic with Annual Exam (Pt is here for [facility] wellness. )     62 y/o female presents to clinic for [facility] Wellness exam. Denies any concerns or questions at present.  Review of Systems     @Review of Systems   Constitutional:  Negative for chills, fatigue and fever.   HENT:  Negative for nasal congestion, ear pain, sinus pressure/congestion and sore throat.    Respiratory:  Negative for cough, chest tightness, shortness of breath and wheezing.    Cardiovascular:  Negative for chest pain and palpitations.   Gastrointestinal:  Negative for abdominal pain, nausea and vomiting.   Musculoskeletal:  Negative for myalgias.   Neurological:  Negative for dizziness, weakness, light-headedness and headaches.   Psychiatric/Behavioral:  Negative for suicidal ideas.        Medical / Social / Family History     Past Medical History:   Diagnosis Date    Anxiety     Hyperlipidemia     Kidney stones     Nephrolithiasis     since she was 13 years.  She has always passed the stones.  No procedures ever required until 2022    PONV (postoperative nausea and vomiting)        Past Surgical History:   Procedure Laterality Date    ADENOIDECTOMY  1967    6 years old    APPENDECTOMY  1971    10 years old     SECTION  08/12/1983    x2 1985    CHOLECYSTECTOMY  2005    CYSTOSCOPY W/ URETERAL STENT PLACEMENT Left 2022    Procedure: CYSTOSCOPY, WITH URETERAL STENT INSERTION;  Surgeon:  Nathen Pierre Jr., MD;  Location: [facility] OR;  Service: Urology;  Laterality: Left;    ECTOPIC PREGNANCY SURGERY  06/01/1984    left foot Left 06/1972    heel    TONSILLECTOMY  1967    6 years old    TUBAL LIGATION  1985    URETERAL STENT PLACEMENT  11/07/2022        URETERORENOSCOPY Left 11/17/2022    Procedure: URETERORENOSCOPY WITH LASER LITHOTRIPSY AND URETERAL STENT EXCHANGE with basket extraction of stone fragments;  Surgeon: Nathen Pierre Jr., MD;  Location: [facility] OR;  Service: Urology;  Laterality: Left;       Social History  Ms.  reports that she has never smoked. She has never been exposed to tobacco smoke. She has never used smokeless tobacco. She reports that she does not drink alcohol and does not use drugs.    Family History  Ms.'s family history includes Diabetes in her paternal grandmother and son; Hearing loss in her mother; No Known Problems in her daughter, maternal grandfather, maternal grandmother, paternal grandfather, and sister; Stroke in her father; Thyroid disease in her mother; Ulcerative colitis in her son.    Medications and Allergies     Medications  Outpatient Medications Marked as Taking for the 9/19/23 encounter (Office Visit) with Jamar Lindsey FNP   Medication Sig Dispense Refill    [DISCONTINUED] clorazepate (TRANXENE) 3.75 MG Tab Take 1 tablet (3.75 mg total) by mouth 2 (two) times daily as needed (anxiety). 60 tablet 0    [DISCONTINUED] ergocalciferol (ERGOCALCIFEROL) 50,000 unit Cap Take 1 capsule (50,000 Units total) by mouth every 7 days. 12 capsule 1    [DISCONTINUED] fluticasone propionate (CUTIVATE) 0.05 % cream APPLY TO THE AFFECTED AREA(S) TWICE DAILY 30 g 2    [DISCONTINUED] lansoprazole (PREVACID) 30 MG capsule TAKE ONE CAPSULE BY MOUTH EVERY DAY BEFORE a meal Strength: 30 mg 30 capsule 3    [DISCONTINUED] lovastatin (MEVACOR) 10 MG tablet TAKE ONE TABLET BY MOUTH AT BEDTIME 30 tablet 2    [DISCONTINUED] naproxen (NAPROSYN) 500 MG tablet Take 1 tablet  (500 mg total) by mouth 2 (two) times daily as needed (joint pain). 60 tablet 2       Allergies  Review of patient's allergies indicates:   Allergen Reactions    Hydrocodone Nausea And Vomiting    Penicillins Nausea Only       Physical Examination     Vitals:    09/19/23 0934   BP: 129/82   Pulse: 69   Resp: 18   Temp: 97.7 °F (36.5 °C)     Physical Exam  Vitals and nursing note reviewed.   Constitutional:       General: She is awake.      Appearance: Normal appearance.   HENT:      Head: Normocephalic.      Right Ear: Tympanic membrane and ear canal normal.      Left Ear: Tympanic membrane and ear canal normal.      Nose: Nose normal.      Mouth/Throat:      Lips: Pink.      Mouth: Mucous membranes are moist.      Pharynx: Oropharynx is clear. Uvula midline.   Eyes:      Conjunctiva/sclera: Conjunctivae normal.      Pupils: Pupils are equal, round, and reactive to light.   Cardiovascular:      Rate and Rhythm: Normal rate and regular rhythm.      Heart sounds: Normal heart sounds, S1 normal and S2 normal.   Pulmonary:      Effort: No respiratory distress.      Breath sounds: Normal breath sounds. No decreased breath sounds, wheezing, rhonchi or rales.   Abdominal:      General: Bowel sounds are normal.      Palpations: Abdomen is soft.      Tenderness: There is no abdominal tenderness.   Musculoskeletal:      Cervical back: Normal range of motion.   Skin:     General: Skin is warm.      Capillary Refill: Capillary refill takes less than 2 seconds.   Neurological:      Mental Status: She is alert and oriented to person, place, and time.   Psychiatric:         Attention and Perception: Attention normal.         Mood and Affect: Mood normal.         Speech: Speech normal.         Behavior: Behavior normal. Behavior is cooperative.         Thought Content: Thought content does not include homicidal or suicidal ideation. Thought content does not include homicidal or suicidal plan.               Lab Results   Component  Value Date    WBC 6.86 09/19/2023    HGB 12.3 09/19/2023    HCT 39.3 09/19/2023    MCV 88.9 09/19/2023     09/19/2023        CMP  Sodium   Date Value Ref Range Status   09/19/2023 141 136 - 145 mmol/L Final     Potassium   Date Value Ref Range Status   09/19/2023 4.2 3.5 - 5.1 mmol/L Final     Chloride   Date Value Ref Range Status   09/19/2023 108 (H) 98 - 107 mmol/L Final     CO2   Date Value Ref Range Status   09/19/2023 26 21 - 32 mmol/L Final     Glucose   Date Value Ref Range Status   09/19/2023 88 74 - 106 mg/dL Final     BUN   Date Value Ref Range Status   09/19/2023 18 7 - 18 mg/dL Final     Creatinine   Date Value Ref Range Status   09/19/2023 0.90 0.55 - 1.02 mg/dL Final     Calcium   Date Value Ref Range Status   09/19/2023 8.7 8.5 - 10.1 mg/dL Final     Total Protein   Date Value Ref Range Status   09/19/2023 7.1 6.4 - 8.2 g/dL Final     Albumin   Date Value Ref Range Status   09/19/2023 3.8 3.5 - 5.0 g/dL Final     Bilirubin, Total   Date Value Ref Range Status   09/19/2023 0.3 >0.0 - 1.2 mg/dL Final     Alk Phos   Date Value Ref Range Status   09/19/2023 109 50 - 130 U/L Final     AST   Date Value Ref Range Status   09/19/2023 21 15 - 37 U/L Final     ALT   Date Value Ref Range Status   09/19/2023 24 13 - 56 U/L Final     Anion Gap   Date Value Ref Range Status   09/19/2023 11 7 - 16 mmol/L Final     eGFR   Date Value Ref Range Status   09/19/2023 72 >=60 mL/min/1.73m2 Final     Procedures   Assessment and Plan (including Health Maintenance)   :    Plan:           Problem List Items Addressed This Visit          Psychiatric    Anxiety    Current Assessment & Plan     Tranxene BID as needed.   UDS performed today and is consistent with use.  checked and is OK. No signs of aberrant behavior. Instructed patient to only take medication as instructed and never share medications with others.                Cardiac/Vascular    Hyperlipidemia    Current Assessment & Plan     Lipid panel obtained at  "today's visit. Goal LDL is less than 70. Continue current meds and low fat/low cholesterol diet with increased exercise as tolerated. Will follow up with labs. Patient to follow up in 3 months or as needed    A few changes in your diet can reduce cholesterol and improve your heart health. Saturated fats, found primarily in red meat and full-fat dairy products, raise your total cholesterol. Decreasing your consumption of saturated fats can reduce your low-density lipoprotein (LDL) cholesterol. rans fats, sometimes listed on food labels as "partially hydrogenated vegetable oil," are often used in margarines and store-bought cookies, crackers and cakes.     Trans fats raise overall cholesterol levels. Omega-3 fatty acids don't affect LDL cholesterol. But they have other heart-healthy benefits, including reducing blood pressure. Foods with omega-3 fatty acids include salmon, mackerel, herring, walnuts and flaxseeds.Soluble fiber can reduce the absorption of cholesterol into your bloodstream. Soluble fiber is found in such foods as oatmeal, kidney beans, [place] sprouts, apples and pears.  at least 30 minutes of exercise five times a week or vigorous aerobic activity for 20 minutes three times a week if tolerated.          Relevant Medications    lovastatin (MEVACOR) 10 MG tablet    Other Relevant Orders    CBC Auto Differential (Completed)    Lipid Panel (Completed)    Comprehensive Metabolic Panel (Completed)    TSH (Completed)       Endocrine    Vitamin D deficiency    Current Assessment & Plan     Continue current medication regimen         Relevant Medications    ergocalciferol (ERGOCALCIFEROL) 50,000 unit Cap    Screening for diabetes mellitus (DM)    Current Assessment & Plan     Lab work today. Will call pt with results and any new orders         Relevant Orders    Hemoglobin A1C (Completed)       GI    GERD (gastroesophageal reflux disease)    Current Assessment & Plan     Symptoms controlled well   Reviewed " pathology of current symptoms, medication side effects/risk/benefits/directions on taking medications, and to take medication as prescribed. Take medication as prescribed. Remain upright for at least one hour after eating or drinking, raise HOB 6-8 inches. Eat small, frequent bland meals instead of large meals. Avoid greasy/spicy/acidic foods. Avoid alcohol and NSAIDS. Monitor for changes in bowel color, texture, etc. Discussed what warrants emergent follow-up or treatment. Patient is to go to ED if they begin vomiting and it looks like blood or coffee grounds. Weight loss may help people who are overweight to reduce acid reflux. Weight loss has a number of other health benefits including decreases risk of heart disease and type 2 DM.         Relevant Medications    lansoprazole (PREVACID) 30 MG capsule       Orthopedic    Arthralgia    Current Assessment & Plan     Medication controlling symptoms. Continue current regimen. Follow up as needed         Relevant Medications    naproxen (NAPROSYN) 500 MG tablet       Palliative Care    Long-term use of high-risk medication - Primary    Current Assessment & Plan     UDS obtained and  checked and all appropriate         Relevant Orders    POCT Urine Drug Screen Presump (Completed)       Health Maintenance Topics with due status: Not Due       Topic Last Completion Date    Cervical Cancer Screening 03/31/2022    Mammogram 04/06/2023    Hemoglobin A1c (Diabetic Prevention Screening) 09/19/2023    Lipid Panel 09/19/2023       Future Appointments   Date Time Provider Department Center   9/19/2024  9:40 AM Jamar Lindsey FNP [facility]        Health Maintenance Due   Topic Date Due    COVID-19 Vaccine (1) Never done    TETANUS VACCINE  Never done    Colorectal Cancer Screening  Never done    Shingles Vaccine (1 of 2) Never done    RSV Vaccine (Age 60+ and Pregnant patients) (1 - 1-dose 60+ series) Never done    Influenza Vaccine (1) 09/01/2023         Follow up in about 3 months (around 12/19/2023).     Signature:  SHAYE MCWILLIAMS [address] [place]  [address]    Date of encounter: 9/19/23

## 2023-09-20 DIAGNOSIS — D64.9 ANEMIA, UNSPECIFIED TYPE: Primary | ICD-10-CM

## 2023-09-20 DIAGNOSIS — E55.9 VITAMIN D DEFICIENCY: ICD-10-CM

## 2023-11-08 ENCOUNTER — TELEPHONE (OUTPATIENT)
Dept: FAMILY MEDICINE | Facility: CLINIC | Age: 63
End: 2023-11-08
Payer: COMMERCIAL

## 2023-11-08 NOTE — TELEPHONE ENCOUNTER
----- Message from Summer Carreon sent at 11/8/2023 10:30 AM CST -----  Pt is needing refills at [facility] in [place] At least until her upcoming appt

## 2023-11-08 NOTE — TELEPHONE ENCOUNTER
Returned Pt's call, she stated that the medication that she needs refilled is her Tranxene. Notified Pt that our providers require office visits in order to write any controlled medications. Pt voiced understanding and stated that she would check her calendar and call back to schedule an appointment for this refill.

## 2023-11-14 ENCOUNTER — OFFICE VISIT (OUTPATIENT)
Dept: FAMILY MEDICINE | Facility: CLINIC | Age: 63
End: 2023-11-14
Payer: COMMERCIAL

## 2023-11-14 VITALS
WEIGHT: 151.19 LBS | SYSTOLIC BLOOD PRESSURE: 118 MMHG | OXYGEN SATURATION: 99 % | HEIGHT: 61 IN | BODY MASS INDEX: 28.55 KG/M2 | HEART RATE: 72 BPM | TEMPERATURE: 96 F | DIASTOLIC BLOOD PRESSURE: 72 MMHG | RESPIRATION RATE: 19 BRPM

## 2023-11-14 DIAGNOSIS — F41.9 ANXIETY: ICD-10-CM

## 2023-11-14 DIAGNOSIS — Z79.899 ON LONG TERM DRUG THERAPY: Primary | ICD-10-CM

## 2023-11-14 LAB
CTP QC/QA: YES
POC (AMP) AMPHETAMINE: NEGATIVE
POC (BAR) BARBITURATES: NEGATIVE
POC (BUP) BUPRENORPHINE: NEGATIVE
POC (BZO) BENZODIAZEPINES: ABNORMAL
POC (COC) COCAINE: NEGATIVE
POC (MDMA) METHYLENEDIOXYMETHAMPHETAMINE 3,4: NEGATIVE
POC (MET) METHAMPHETAMINE: NEGATIVE
POC (MOP) OPIATES: NEGATIVE
POC (MTD) METHADONE: NEGATIVE
POC (OXY) OXYCODONE: NEGATIVE
POC (PCP) PHENCYCLIDINE: NEGATIVE
POC (TCA) TRICYCLIC ANTIDEPRESSANTS: NEGATIVE
POC TEMPERATURE (URINE): 94
POC THC: NEGATIVE

## 2023-11-14 PROCEDURE — 3044F PR MOST RECENT HEMOGLOBIN A1C LEVEL <7.0%: ICD-10-PCS | Mod: CPTII,,,

## 2023-11-14 PROCEDURE — 1159F MED LIST DOCD IN RCRD: CPT | Mod: CPTII,,,

## 2023-11-14 PROCEDURE — 3078F DIAST BP <80 MM HG: CPT | Mod: CPTII,,,

## 2023-11-14 PROCEDURE — 99213 OFFICE O/P EST LOW 20 MIN: CPT | Mod: ,,,

## 2023-11-14 PROCEDURE — 99213 PR OFFICE/OUTPT VISIT, EST, LEVL III, 20-29 MIN: ICD-10-PCS | Mod: ,,,

## 2023-11-14 PROCEDURE — 1160F PR REVIEW ALL MEDS BY PRESCRIBER/CLIN PHARMACIST DOCUMENTED: ICD-10-PCS | Mod: CPTII,,,

## 2023-11-14 PROCEDURE — 80305 DRUG TEST PRSMV DIR OPT OBS: CPT | Mod: QW,,,

## 2023-11-14 PROCEDURE — 3044F HG A1C LEVEL LT 7.0%: CPT | Mod: CPTII,,,

## 2023-11-14 PROCEDURE — 3074F SYST BP LT 130 MM HG: CPT | Mod: CPTII,,,

## 2023-11-14 PROCEDURE — 3008F BODY MASS INDEX DOCD: CPT | Mod: CPTII,,,

## 2023-11-14 PROCEDURE — 3074F PR MOST RECENT SYSTOLIC BLOOD PRESSURE < 130 MM HG: ICD-10-PCS | Mod: CPTII,,,

## 2023-11-14 PROCEDURE — 1159F PR MEDICATION LIST DOCUMENTED IN MEDICAL RECORD: ICD-10-PCS | Mod: CPTII,,,

## 2023-11-14 PROCEDURE — 80305 POCT URINE DRUG SCREEN PRESUMP: ICD-10-PCS | Mod: QW,,,

## 2023-11-14 PROCEDURE — 3008F PR BODY MASS INDEX (BMI) DOCUMENTED: ICD-10-PCS | Mod: CPTII,,,

## 2023-11-14 PROCEDURE — 1160F RVW MEDS BY RX/DR IN RCRD: CPT | Mod: CPTII,,,

## 2023-11-14 PROCEDURE — 3078F PR MOST RECENT DIASTOLIC BLOOD PRESSURE < 80 MM HG: ICD-10-PCS | Mod: CPTII,,,

## 2023-11-14 RX ORDER — CLORAZEPATE DIPOTASSIUM 3.75 MG/1
3.75 TABLET ORAL 2 TIMES DAILY PRN
Qty: 60 TABLET | Refills: 0 | Status: CANCELLED | OUTPATIENT
Start: 2023-11-14

## 2023-11-14 RX ORDER — CLORAZEPATE DIPOTASSIUM 3.75 MG/1
3.75 TABLET ORAL 2 TIMES DAILY PRN
Qty: 60 TABLET | Refills: 1 | Status: SHIPPED | OUTPATIENT
Start: 2023-11-14 | End: 2024-02-15 | Stop reason: SDUPTHER

## 2023-11-14 RX ORDER — CONJUGATED ESTROGENS/BAZEDOXIFENE .45; 2 MG/1; MG/1
1 TABLET, FILM COATED ORAL DAILY
COMMUNITY
Start: 2023-10-28

## 2023-11-14 NOTE — ASSESSMENT & PLAN NOTE
Anxiety symptoms controlled well with medication. Denies any thoughts of suicide or self harm.  and UDS checked today and both appropriate. Follow up 2 months.

## 2023-11-14 NOTE — PROGRESS NOTES
ESVIN JOHNSON   [place] [address]  [place]  [address]      PATIENT NAME: Ami Flores  : 1960  DATE: 23  MRN: [id number]      Billing Provider: ESVIN JOHNSON  Level of Service: CO OFFICE/OUTPT VISIT, EST, LEVL III, 20-29 MIN  Patient PCP Information       Provider PCP Type    ESVIN JOHNSON General            Reason for Visit / Chief Complaint: Health Maintenance (Ami Flores 62 year old female presents to clinic for a check up,lab work and medication refills. Reports no questions or concerns present today. She is doing very well(stated by patient))         History of Present Illness / Problem Focused Workflow     Ami Flores presents to the clinic with Health Maintenance (Ami Flores 62 year old female presents to clinic for a check up,lab work and medication refills. Reports no questions or concerns present today. She is doing very well(stated by patient))     63 y/o female presents to clinic for medication refills. She denies any concerns or questions at present. Denies any thoughts of suicide or self harm.     Review of Systems     @Review of Systems   Constitutional:  Negative for chills, fatigue and fever.   HENT:  Negative for nasal congestion, ear pain, sinus pressure/congestion and sore throat.    Respiratory:  Negative for cough, chest tightness, shortness of breath and wheezing.    Cardiovascular:  Negative for chest pain and palpitations.   Gastrointestinal:  Negative for abdominal pain, nausea and vomiting.   Musculoskeletal:  Negative for myalgias.   Neurological:  Negative for dizziness, weakness, light-headedness and headaches.   Psychiatric/Behavioral:  Negative for suicidal ideas.        Medical / Social / Family History     Past Medical History:   Diagnosis Date    Anxiety     Hyperlipidemia     Kidney stones     Nephrolithiasis     since she was 13 years.  She has always passed the stones.  No procedures ever  required until 2022    PONV (postoperative nausea and vomiting)        Past Surgical History:   Procedure Laterality Date    ADENOIDECTOMY  1967    6 years old    APPENDECTOMY  1971    10 years old     SECTION  08/12/1983    x2 1985    CHOLECYSTECTOMY      CYSTOSCOPY W/ URETERAL STENT PLACEMENT Left 2022    Procedure: CYSTOSCOPY, WITH URETERAL STENT INSERTION;  Surgeon: Nathen Pierre Jr., MD;  Location: [facility];  Service: Urology;  Laterality: Left;    ECTOPIC PREGNANCY SURGERY  1984    left foot Left 1972    heel    TONSILLECTOMY  1967    6 years old    TUBAL LIGATION      URETERAL STENT PLACEMENT  2022        URETERORENOSCOPY Left 2022    Procedure: URETERORENOSCOPY WITH LASER LITHOTRIPSY AND URETERAL STENT EXCHANGE with basket extraction of stone fragments;  Surgeon: Nathen Pierre Jr., MD;  Location: [facility] OR;  Service: Urology;  Laterality: Left;       Social History  Ms.  reports that she has never smoked. She has never been exposed to tobacco smoke. She has never used smokeless tobacco. She reports that she does not drink alcohol and does not use drugs.    Family History  Ms.'s family history includes Diabetes in her paternal grandmother and son; Hearing loss in her mother; No Known Problems in her daughter, maternal grandfather, maternal grandmother, paternal grandfather, and sister; Stroke in her father; Thyroid disease in her mother; Ulcerative colitis in her son.    Medications and Allergies     Medications  Outpatient Medications Marked as Taking for the 23 encounter (Office Visit) with Jamar Lindsey FNP   Medication Sig Dispense Refill    DUAVEE 0.45-20 mg Tab Take 1 tablet by mouth once daily.      ergocalciferol (ERGOCALCIFEROL) 50,000 unit Cap Take 1 capsule (50,000 Units total) by mouth every 7 days. 12 capsule 1    fluticasone propionate (CUTIVATE) 0.05 % cream APPLY TO THE AFFECTED AREA(S) TWICE DAILY 30 g 2     lansoprazole (PREVACID) 30 MG capsule TAKE ONE CAPSULE BY MOUTH EVERY DAY BEFORE a meal Strength: 30 mg 30 capsule 3    lovastatin (MEVACOR) 10 MG tablet TAKE ONE TABLET BY MOUTH AT BEDTIME 30 tablet 2    naproxen (NAPROSYN) 500 MG tablet Take 1 tablet (500 mg total) by mouth 2 (two) times daily as needed (joint pain). 60 tablet 2    [DISCONTINUED] clorazepate (TRANXENE) 3.75 MG Tab Take 1 tablet (3.75 mg total) by mouth 2 (two) times daily as needed (anxiety). 60 tablet 0       Allergies  Review of patient's allergies indicates:   Allergen Reactions    Hydrocodone Nausea And Vomiting    Penicillins Nausea Only       Physical Examination     Vitals:    11/14/23 1110   BP: 118/72   Pulse: 72   Resp: 19   Temp: 96.3 °F (35.7 °C)     Physical Exam  Vitals and nursing note reviewed.   Constitutional:       General: She is awake.      Appearance: Normal appearance.   HENT:      Head: Normocephalic.      Right Ear: Tympanic membrane and ear canal normal.      Left Ear: Tympanic membrane and ear canal normal.      Nose: Nose normal.      Mouth/Throat:      Lips: Pink.      Mouth: Mucous membranes are moist.      Pharynx: Oropharynx is clear. Uvula midline.   Eyes:      Conjunctiva/sclera: Conjunctivae normal.      Pupils: Pupils are equal, round, and reactive to light.   Cardiovascular:      Rate and Rhythm: Normal rate and regular rhythm.      Heart sounds: Normal heart sounds, S1 normal and S2 normal.   Pulmonary:      Effort: No respiratory distress.      Breath sounds: Normal breath sounds. No decreased breath sounds, wheezing, rhonchi or rales.   Abdominal:      General: Bowel sounds are normal.      Palpations: Abdomen is soft.      Tenderness: There is no abdominal tenderness.   Musculoskeletal:      Cervical back: Normal range of motion.   Skin:     General: Skin is warm.      Capillary Refill: Capillary refill takes less than 2 seconds.   Neurological:      Mental Status: She is alert and oriented to person,  place, and time.   Psychiatric:         Attention and Perception: Attention normal.         Mood and Affect: Mood normal.         Speech: Speech normal.         Behavior: Behavior normal. Behavior is cooperative.         Thought Content: Thought content does not include homicidal or suicidal ideation. Thought content does not include homicidal or suicidal plan.               Lab Results   Component Value Date    WBC 6.86 09/19/2023    HGB 12.3 09/19/2023    HCT 39.3 09/19/2023    MCV 88.9 09/19/2023     09/19/2023        CMP  Sodium   Date Value Ref Range Status   09/19/2023 141 136 - 145 mmol/L Final     Potassium   Date Value Ref Range Status   09/19/2023 4.2 3.5 - 5.1 mmol/L Final     Chloride   Date Value Ref Range Status   09/19/2023 108 (H) 98 - 107 mmol/L Final     CO2   Date Value Ref Range Status   09/19/2023 26 21 - 32 mmol/L Final     Glucose   Date Value Ref Range Status   09/19/2023 88 74 - 106 mg/dL Final     BUN   Date Value Ref Range Status   09/19/2023 18 7 - 18 mg/dL Final     Creatinine   Date Value Ref Range Status   09/19/2023 0.90 0.55 - 1.02 mg/dL Final     Calcium   Date Value Ref Range Status   09/19/2023 8.7 8.5 - 10.1 mg/dL Final     Total Protein   Date Value Ref Range Status   09/19/2023 7.1 6.4 - 8.2 g/dL Final     Albumin   Date Value Ref Range Status   09/19/2023 3.8 3.5 - 5.0 g/dL Final     Bilirubin, Total   Date Value Ref Range Status   09/19/2023 0.3 >0.0 - 1.2 mg/dL Final     Alk Phos   Date Value Ref Range Status   09/19/2023 109 50 - 130 U/L Final     AST   Date Value Ref Range Status   09/19/2023 21 15 - 37 U/L Final     ALT   Date Value Ref Range Status   09/19/2023 24 13 - 56 U/L Final     Anion Gap   Date Value Ref Range Status   09/19/2023 11 7 - 16 mmol/L Final     eGFR   Date Value Ref Range Status   09/19/2023 72 >=60 mL/min/1.73m2 Final     Procedures   Assessment and Plan (including Health Maintenance)   :    Plan:           Problem List Items Addressed This  Visit          Psychiatric    Anxiety    Current Assessment & Plan     Anxiety symptoms controlled well with medication. Denies any thoughts of suicide or self harm.  and UDS checked today and both appropriate. Follow up 2 months.          Relevant Medications    clorazepate (TRANXENE) 3.75 MG Tab    Other Relevant Orders    POCT Urine Drug Screen Presump     Other Visit Diagnoses       On long term drug therapy    -  Primary    Relevant Orders    POCT Urine Drug Screen Presump (Completed)    POCT Urine Drug Screen Presump            Health Maintenance Topics with due status: Not Due       Topic Last Completion Date    Cervical Cancer Screening 03/31/2022    Mammogram 04/06/2023    Hemoglobin A1c (Diabetic Prevention Screening) 09/19/2023    Lipid Panel 09/19/2023       Future Appointments   Date Time Provider Department Center   9/19/2024  9:40 AM Jamar Lindsey FNP [facility]        Health Maintenance Due   Topic Date Due    COVID-19 Vaccine (1) Never done    TETANUS VACCINE  Never done    Colorectal Cancer Screening  Never done    Shingles Vaccine (1 of 2) Never done    RSV Vaccine (Age 60+) (1 - 1-dose 60+ series) Never done    Influenza Vaccine (1) 09/01/2023        Follow up in about 2 months (around 1/14/2024).     Signature:  ESVIN JOHNSON  [facility]  [address] [address] [place]  [address]    Date of encounter: 11/14/23

## 2023-11-30 PROBLEM — Z13.1 SCREENING FOR DIABETES MELLITUS (DM): Status: ACTIVE | Noted: 2023-11-30

## 2023-11-30 PROBLEM — Z79.899 LONG-TERM USE OF HIGH-RISK MEDICATION: Status: ACTIVE | Noted: 2023-11-30

## 2023-11-30 NOTE — ASSESSMENT & PLAN NOTE
Symptoms controlled well   Reviewed pathology of current symptoms, medication side effects/risk/benefits/directions on taking medications, and to take medication as prescribed. Take medication as prescribed. Remain upright for at least one hour after eating or drinking, raise HOB 6-8 inches. Eat small, frequent bland meals instead of large meals. Avoid greasy/spicy/acidic foods. Avoid alcohol and NSAIDS. Monitor for changes in bowel color, texture, etc. Discussed what warrants emergent follow-up or treatment. Patient is to go to ED if they begin vomiting and it looks like blood or coffee grounds. Weight loss may help people who are overweight to reduce acid reflux. Weight loss has a number of other health benefits including decreases risk of heart disease and type 2 DM.

## 2023-11-30 NOTE — ASSESSMENT & PLAN NOTE
"Lipid panel obtained at today's visit. Goal LDL is less than 70. Continue current meds and low fat/low cholesterol diet with increased exercise as tolerated. Will follow up with labs. Patient to follow up in 3 months or as needed    A few changes in your diet can reduce cholesterol and improve your heart health. Saturated fats, found primarily in red meat and full-fat dairy products, raise your total cholesterol. Decreasing your consumption of saturated fats can reduce your low-density lipoprotein (LDL) cholesterol. rans fats, sometimes listed on food labels as "partially hydrogenated vegetable oil," are often used in margarines and store-bought cookies, crackers and cakes.     Trans fats raise overall cholesterol levels. Omega-3 fatty acids don't affect LDL cholesterol. But they have other heart-healthy benefits, including reducing blood pressure. Foods with omega-3 fatty acids include salmon, mackerel, herring, walnuts and flaxseeds.Soluble fiber can reduce the absorption of cholesterol into your bloodstream. Soluble fiber is found in such foods as oatmeal, kidney beans, [place] sprouts, apples and pears.  at least 30 minutes of exercise five times a week or vigorous aerobic activity for 20 minutes three times a week if tolerated.   "

## 2024-02-15 ENCOUNTER — OFFICE VISIT (OUTPATIENT)
Dept: FAMILY MEDICINE | Facility: CLINIC | Age: 64
End: 2024-02-15
Payer: COMMERCIAL

## 2024-02-15 VITALS
WEIGHT: 152.81 LBS | BODY MASS INDEX: 28.85 KG/M2 | HEART RATE: 74 BPM | TEMPERATURE: 98 F | DIASTOLIC BLOOD PRESSURE: 82 MMHG | HEIGHT: 61 IN | RESPIRATION RATE: 18 BRPM | SYSTOLIC BLOOD PRESSURE: 133 MMHG | OXYGEN SATURATION: 99 %

## 2024-02-15 DIAGNOSIS — E78.2 MIXED HYPERLIPIDEMIA: Primary | ICD-10-CM

## 2024-02-15 DIAGNOSIS — Z79.899 LONG-TERM USE OF HIGH-RISK MEDICATION: ICD-10-CM

## 2024-02-15 DIAGNOSIS — F41.9 ANXIETY: ICD-10-CM

## 2024-02-15 PROCEDURE — 1160F RVW MEDS BY RX/DR IN RCRD: CPT | Mod: CPTII,,,

## 2024-02-15 PROCEDURE — 80305 DRUG TEST PRSMV DIR OPT OBS: CPT | Mod: QW,,,

## 2024-02-15 PROCEDURE — 1159F MED LIST DOCD IN RCRD: CPT | Mod: CPTII,,,

## 2024-02-15 PROCEDURE — 3079F DIAST BP 80-89 MM HG: CPT | Mod: CPTII,,,

## 2024-02-15 PROCEDURE — 99214 OFFICE O/P EST MOD 30 MIN: CPT | Mod: ,,,

## 2024-02-15 PROCEDURE — 3075F SYST BP GE 130 - 139MM HG: CPT | Mod: CPTII,,,

## 2024-02-15 PROCEDURE — 3008F BODY MASS INDEX DOCD: CPT | Mod: CPTII,,,

## 2024-02-15 RX ORDER — CLORAZEPATE DIPOTASSIUM 3.75 MG/1
3.75 TABLET ORAL 2 TIMES DAILY PRN
Qty: 60 TABLET | Refills: 1 | Status: SHIPPED | OUTPATIENT
Start: 2024-02-15

## 2024-02-15 NOTE — PROGRESS NOTES
02/15/24 1101   Depression Patient Health Questionnaire (PHQ-2)   Over the last two weeks how often have you been bothered by little interest or pleasure in doing things 0   Over the last two weeks how often have you been bothered by feeling down, depressed or hopeless 0   PHQ-2 Total Score 0   Depression Patient Health Questionnaire (PHQ-9)   Over the last two weeks how often have you been bothered by trouble falling or staying asleep, or sleeping too much 1   Over the last two weeks how often have you been bothered by feeling tired or having little energy 0   Over the last two weeks how often have you been bothered by a poor appetite or overeating 0   Over the last two weeks how often have you been bothered by feeling bad about yourself - or that you are a failure or have let yourself or your family down 0   Over the last two weeks how often have you been bothered by trouble concentrating on things, such as reading the newspaper or watching television 0   Over the last two weeks how often have you been bothered by moving or speaking so slowly that other people could have noticed. Or the opposite - being so fidgety or restless that you have been moving around a lot more than usual. 0   Over the last two weeks how often have you been bothered by thoughts that you would be better off dead, or of hurting yourself 0   If you checked off any problems, how difficult have these problems made it for you to do your work, take care of things at home or get along with other people? Not difficult at all   PHQ-9 Score 1   PHQ-9 Interpretation Minimal or None

## 2024-02-15 NOTE — PROGRESS NOTES
SHAYE MCWILLIAMS, ESVIN   [facility]  [address]  [place]  [address]      PATIENT NAME: Ami Flores  : 1960  DATE: 2/15/24  MRN: [id number]        Reason for Visit / Chief Complaint: Follow-up (3 month follow-up. Reports she has a long history of kidney stones and wanting to see if she can have some toradol on hand for when she gets them to prevent ER visit. ), Tinnitus (Has had this trouble for a long time. Reports it has been worse lately. It is both ears but worse right above left ear. Reports if it gets bad enough she wakes up with a headache. ), Hyperlipidemia (Routine check-up), Anxiety (Routine check-up. ), Gastroesophageal Reflux (Routine check-up. ), and Arthritis (Routine check-up. )         History of Present Illness / Problem Focused Workflow     62 y/o female presents to clinic for follow up and medication refill. She denies any concerns regarding her medication. She is complaining of tinnitus. She states she has had issues with this for some while. She has seen ENT in the past in [place] and states she will make appt with them if it does not improved.        Review of Systems     @Review of Systems   Constitutional:  Negative for chills, fatigue and fever.   HENT:  Positive for tinnitus. Negative for nasal congestion, ear discharge, ear pain, rhinorrhea, sinus pressure/congestion and sore throat.    Respiratory:  Negative for cough, chest tightness, shortness of breath, wheezing and stridor.    Cardiovascular:  Negative for palpitations and claudication.   Gastrointestinal:  Negative for abdominal pain, constipation, diarrhea, nausea, vomiting and reflux.   Genitourinary:  Negative for dysuria, flank pain, frequency, hematuria and urgency.   Musculoskeletal:  Negative for myalgias.   Neurological:  Negative for dizziness, weakness, light-headedness and headaches.   Psychiatric/Behavioral:  Negative for suicidal ideas.        Medical / Social / Family History     Past  Medical History:   Diagnosis Date    Anxiety     Hyperlipidemia     Kidney stones     Nephrolithiasis     since she was 13 years.  She has always passed the stones.  No procedures ever required until 2022    PONV (postoperative nausea and vomiting)        Past Surgical History:   Procedure Laterality Date    ADENOIDECTOMY  1967    6 years old    APPENDECTOMY  1971    10 years old     SECTION  08/12/1983    x2 1985    CHOLECYSTECTOMY  2005    CYSTOSCOPY W/ URETERAL STENT PLACEMENT Left 2022    Procedure: CYSTOSCOPY, WITH URETERAL STENT INSERTION;  Surgeon: Nathen Pierre Jr., MD;  Location: [facility];  Service: Urology;  Laterality: Left;    ECTOPIC PREGNANCY SURGERY  1984    left foot Left 1972    heel    TONSILLECTOMY      6 years old    TUBAL LIGATION      URETERAL STENT PLACEMENT  2022        URETERORENOSCOPY Left 2022    Procedure: URETERORENOSCOPY WITH LASER LITHOTRIPSY AND URETERAL STENT EXCHANGE with basket extraction of stone fragments;  Surgeon: Nathen Pierre Jr., MD;  Location: [facility] OR;  Service: Urology;  Laterality: Left;           Medications and Allergies     Medications  Outpatient Medications Marked as Taking for the 2/15/24 encounter (Office Visit) with Jamar Lindsey FNP   Medication Sig Dispense Refill    DUAVEE 0.45-20 mg Tab Take 1 tablet by mouth once daily.      ergocalciferol (ERGOCALCIFEROL) 50,000 unit Cap Take 1 capsule (50,000 Units total) by mouth every 7 days. 12 capsule 1    fluticasone propionate (CUTIVATE) 0.05 % cream APPLY TO THE AFFECTED AREA(S) TWICE DAILY 30 g 2    lansoprazole (PREVACID) 30 MG capsule TAKE ONE CAPSULE BY MOUTH EVERY DAY BEFORE a meal Strength: 30 mg 30 capsule 3    lovastatin (MEVACOR) 10 MG tablet TAKE ONE TABLET BY MOUTH AT BEDTIME 30 tablet 2    naproxen (NAPROSYN) 500 MG tablet Take 1 tablet (500 mg total) by mouth 2 (two) times daily as needed (joint pain). 60 tablet 2    [DISCONTINUED]  clorazepate (TRANXENE) 3.75 MG Tab Take 1 tablet (3.75 mg total) by mouth 2 (two) times daily as needed (anxiety). 60 tablet 1       Allergies  Review of patient's allergies indicates:   Allergen Reactions    Hydrocodone Nausea And Vomiting    Penicillins Nausea Only       Physical Examination     Vitals:    02/15/24 1053   BP: 133/82   Pulse: 74   Resp: 18   Temp: 97.8 °F (36.6 °C)     Physical Exam  Vitals and nursing note reviewed.   Constitutional:       General: She is awake.      Appearance: Normal appearance.   HENT:      Head: Normocephalic.      Right Ear: Tympanic membrane, ear canal and external ear normal.      Left Ear: Tympanic membrane, ear canal and external ear normal.      Nose: Nose normal.      Mouth/Throat:      Lips: Pink.      Mouth: Mucous membranes are moist.      Pharynx: Oropharynx is clear. Uvula midline.   Cardiovascular:      Rate and Rhythm: Normal rate and regular rhythm.      Heart sounds: Normal heart sounds, S1 normal and S2 normal.   Pulmonary:      Effort: Pulmonary effort is normal. No respiratory distress.      Breath sounds: Normal breath sounds. No decreased breath sounds, wheezing, rhonchi or rales.   Abdominal:      General: Bowel sounds are normal.      Palpations: Abdomen is soft.      Tenderness: There is no abdominal tenderness.   Musculoskeletal:      Cervical back: Normal range of motion.   Skin:     General: Skin is warm.      Capillary Refill: Capillary refill takes less than 2 seconds.   Neurological:      Mental Status: She is alert and oriented to person, place, and time.   Psychiatric:         Thought Content: Thought content does not include homicidal or suicidal ideation. Thought content does not include homicidal or suicidal plan.               Procedures   Assessment and Plan (including Health Maintenance)   :    Plan:     Problem List Items Addressed This Visit          Psychiatric    Anxiety    Current Assessment & Plan     Tranxene BID as needed.   UDS  "performed today and is consistent with use.  checked and is OK. No signs of aberrant behavior. Instructed patient to only take medication as instructed and never share medications with others.             Relevant Medications    clorazepate (TRANXENE) 3.75 MG Tab       Cardiac/Vascular    Hyperlipidemia - Primary    Current Assessment & Plan      Goal LDL is less than 70. Continue current meds and low fat/low cholesterol diet with increased exercise as tolerated. Will follow up with labs. Patient to follow up in 3 months or as needed    A few changes in your diet can reduce cholesterol and improve your heart health. Saturated fats, found primarily in red meat and full-fat dairy products, raise your total cholesterol. Decreasing your consumption of saturated fats can reduce your low-density lipoprotein (LDL) cholesterol. rans fats, sometimes listed on food labels as "partially hydrogenated vegetable oil," are often used in margarines and store-bought cookies, crackers and cakes.     Trans fats raise overall cholesterol levels. Omega-3 fatty acids don't affect LDL cholesterol. But they have other heart-healthy benefits, including reducing blood pressure. Foods with omega-3 fatty acids include salmon, mackerel, herring, walnuts and flaxseeds.Soluble fiber can reduce the absorption of cholesterol into your bloodstream. Soluble fiber is found in such foods as oatmeal, kidney beans, [place] sprouts, apples and pears.  at least 30 minutes of exercise five times a week or vigorous aerobic activity for 20 minutes three times a week if tolerated.          Relevant Orders    CBC Auto Differential    Comprehensive Metabolic Panel    Lipid Panel       Palliative Care    Long-term use of high-risk medication    Relevant Orders    POCT Urine Drug Screen Presump (Completed)       Health Maintenance Topics with due status: Not Due       Topic Last Completion Date    Cervical Cancer Screening 03/31/2022    Hemoglobin A1c " (Diabetic Prevention Screening) 09/19/2023    Lipid Panel 09/19/2023       Future Appointments   Date Time Provider Department Center   9/19/2024  9:40 AM Jamar Lindsey FNP [facility]        Health Maintenance Due   Topic Date Due    COVID-19 Vaccine (1) Never done    TETANUS VACCINE  Never done    Colorectal Cancer Screening  Never done    Shingles Vaccine (1 of 2) Never done    RSV Vaccine (Age 60+ and Pregnant patients) (1 - 1-dose 60+ series) Never done    Mammogram  04/06/2024        No follow-ups on file.     Signature:  ESVIN JOHNSON  [facility]  [address] [address] [place]  [address]    Date of encounter: 2/15/24

## 2024-02-15 NOTE — ASSESSMENT & PLAN NOTE
" Goal LDL is less than 70. Continue current meds and low fat/low cholesterol diet with increased exercise as tolerated. Will follow up with labs. Patient to follow up in 3 months or as needed    A few changes in your diet can reduce cholesterol and improve your heart health. Saturated fats, found primarily in red meat and full-fat dairy products, raise your total cholesterol. Decreasing your consumption of saturated fats can reduce your low-density lipoprotein (LDL) cholesterol. rans fats, sometimes listed on food labels as "partially hydrogenated vegetable oil," are often used in margarines and store-bought cookies, crackers and cakes.     Trans fats raise overall cholesterol levels. Omega-3 fatty acids don't affect LDL cholesterol. But they have other heart-healthy benefits, including reducing blood pressure. Foods with omega-3 fatty acids include salmon, mackerel, herring, walnuts and flaxseeds.Soluble fiber can reduce the absorption of cholesterol into your bloodstream. Soluble fiber is found in such foods as oatmeal, kidney beans, [place] sprouts, apples and pears.  at least 30 minutes of exercise five times a week or vigorous aerobic activity for 20 minutes three times a week if tolerated.   "

## 2024-03-04 PROBLEM — Z13.1 SCREENING FOR DIABETES MELLITUS (DM): Status: RESOLVED | Noted: 2023-11-30 | Resolved: 2024-03-04

## 2024-04-11 LAB — HM MAMMOGRAM: NORMAL

## 2024-04-12 LAB
HIV 1/O/2 ABS-ICMA: NONREACTIVE
HUMAN PAPILLOMAVIRUS (HPV): NORMAL

## 2024-04-17 LAB — PAP RECOMMENDATION EXT: NORMAL

## 2024-07-08 ENCOUNTER — PATIENT OUTREACH (OUTPATIENT)
Facility: HOSPITAL | Age: 64
End: 2024-07-08
Payer: COMMERCIAL

## 2024-07-08 NOTE — PROGRESS NOTES

## 2024-07-22 ENCOUNTER — OFFICE VISIT (OUTPATIENT)
Dept: FAMILY MEDICINE | Facility: CLINIC | Age: 64
End: 2024-07-22
Payer: COMMERCIAL

## 2024-07-22 VITALS
HEIGHT: 61 IN | HEART RATE: 69 BPM | TEMPERATURE: 98 F | OXYGEN SATURATION: 98 % | DIASTOLIC BLOOD PRESSURE: 82 MMHG | RESPIRATION RATE: 20 BRPM | WEIGHT: 154 LBS | SYSTOLIC BLOOD PRESSURE: 132 MMHG | BODY MASS INDEX: 29.07 KG/M2

## 2024-07-22 DIAGNOSIS — F41.9 ANXIETY: ICD-10-CM

## 2024-07-22 DIAGNOSIS — I10 PRIMARY HYPERTENSION: ICD-10-CM

## 2024-07-22 DIAGNOSIS — Z13.1 SCREENING FOR DIABETES MELLITUS (DM): ICD-10-CM

## 2024-07-22 DIAGNOSIS — Z79.899 HIGH RISK MEDICATION USE: ICD-10-CM

## 2024-07-22 DIAGNOSIS — E78.5 HYPERLIPIDEMIA, UNSPECIFIED HYPERLIPIDEMIA TYPE: Primary | ICD-10-CM

## 2024-07-22 DIAGNOSIS — E61.1 LOW IRON: ICD-10-CM

## 2024-07-22 DIAGNOSIS — H93.13 TINNITUS OF BOTH EARS: ICD-10-CM

## 2024-07-22 LAB
ALBUMIN SERPL BCP-MCNC: 3.9 G/DL (ref 3.5–5)
ALBUMIN/GLOB SERPL: 1.3 {RATIO}
ALP SERPL-CCNC: 116 U/L (ref 50–130)
ALT SERPL W P-5'-P-CCNC: 30 U/L (ref 13–56)
ANION GAP SERPL CALCULATED.3IONS-SCNC: 11 MMOL/L (ref 7–16)
AST SERPL W P-5'-P-CCNC: 24 U/L (ref 15–37)
BASOPHILS # BLD AUTO: 0.07 K/UL (ref 0–0.2)
BASOPHILS NFR BLD AUTO: 1 % (ref 0–1)
BILIRUB SERPL-MCNC: 0.4 MG/DL (ref ?–1.2)
BUN SERPL-MCNC: 21 MG/DL (ref 7–18)
BUN/CREAT SERPL: 24 (ref 6–20)
CALCIUM SERPL-MCNC: 9.1 MG/DL (ref 8.5–10.1)
CHLORIDE SERPL-SCNC: 107 MMOL/L (ref 98–107)
CHOLEST SERPL-MCNC: 212 MG/DL (ref 0–200)
CHOLEST/HDLC SERPL: 2.7 {RATIO}
CO2 SERPL-SCNC: 25 MMOL/L (ref 21–32)
CREAT SERPL-MCNC: 0.89 MG/DL (ref 0.55–1.02)
DIFFERENTIAL METHOD BLD: ABNORMAL
EGFR (NO RACE VARIABLE) (RUSH/TITUS): 73 ML/MIN/1.73M2
EOSINOPHIL # BLD AUTO: 0.23 K/UL (ref 0–0.5)
EOSINOPHIL NFR BLD AUTO: 3.4 % (ref 1–4)
ERYTHROCYTE [DISTWIDTH] IN BLOOD BY AUTOMATED COUNT: 15.2 % (ref 11.5–14.5)
EST. AVERAGE GLUCOSE BLD GHB EST-MCNC: 123 MG/DL
GLOBULIN SER-MCNC: 2.9 G/DL (ref 2–4)
GLUCOSE SERPL-MCNC: 109 MG/DL (ref 74–106)
HBA1C MFR BLD HPLC: 5.9 % (ref 4.5–6.6)
HCT VFR BLD AUTO: 39.8 % (ref 38–47)
HDLC SERPL-MCNC: 80 MG/DL (ref 40–60)
HGB BLD-MCNC: 12.1 G/DL (ref 12–16)
IMM GRANULOCYTES # BLD AUTO: 0.02 K/UL (ref 0–0.04)
IMM GRANULOCYTES NFR BLD: 0.3 % (ref 0–0.4)
LDLC SERPL CALC-MCNC: 109 MG/DL
LDLC/HDLC SERPL: 1.4 {RATIO}
LYMPHOCYTES # BLD AUTO: 2.43 K/UL (ref 1–4.8)
LYMPHOCYTES NFR BLD AUTO: 35.8 % (ref 27–41)
MCH RBC QN AUTO: 27.4 PG (ref 27–31)
MCHC RBC AUTO-ENTMCNC: 30.4 G/DL (ref 32–36)
MCV RBC AUTO: 90.2 FL (ref 80–96)
MONOCYTES # BLD AUTO: 0.36 K/UL (ref 0–0.8)
MONOCYTES NFR BLD AUTO: 5.3 % (ref 2–6)
MPC BLD CALC-MCNC: 10.5 FL (ref 9.4–12.4)
NEUTROPHILS # BLD AUTO: 3.67 K/UL (ref 1.8–7.7)
NEUTROPHILS NFR BLD AUTO: 54.2 % (ref 53–65)
NONHDLC SERPL-MCNC: 132 MG/DL
NRBC # BLD AUTO: 0 X10E3/UL
NRBC, AUTO (.00): 0 %
PLATELET # BLD AUTO: 273 K/UL (ref 150–400)
POTASSIUM SERPL-SCNC: 4.4 MMOL/L (ref 3.5–5.1)
PROT SERPL-MCNC: 6.8 G/DL (ref 6.4–8.2)
RBC # BLD AUTO: 4.41 M/UL (ref 4.2–5.4)
SODIUM SERPL-SCNC: 139 MMOL/L (ref 136–145)
TRIGL SERPL-MCNC: 113 MG/DL (ref 35–150)
VLDLC SERPL-MCNC: 23 MG/DL
WBC # BLD AUTO: 6.78 K/UL (ref 4.5–11)

## 2024-07-22 PROCEDURE — 4010F ACE/ARB THERAPY RXD/TAKEN: CPT | Mod: CPTII,,,

## 2024-07-22 PROCEDURE — 83036 HEMOGLOBIN GLYCOSYLATED A1C: CPT | Mod: ,,, | Performed by: CLINICAL MEDICAL LABORATORY

## 2024-07-22 PROCEDURE — 3008F BODY MASS INDEX DOCD: CPT | Mod: CPTII,,,

## 2024-07-22 PROCEDURE — 99214 OFFICE O/P EST MOD 30 MIN: CPT | Mod: ,,,

## 2024-07-22 PROCEDURE — 3079F DIAST BP 80-89 MM HG: CPT | Mod: CPTII,,,

## 2024-07-22 PROCEDURE — 85025 COMPLETE CBC W/AUTO DIFF WBC: CPT | Mod: ,,, | Performed by: CLINICAL MEDICAL LABORATORY

## 2024-07-22 PROCEDURE — 3075F SYST BP GE 130 - 139MM HG: CPT | Mod: CPTII,,,

## 2024-07-22 PROCEDURE — 80053 COMPREHEN METABOLIC PANEL: CPT | Mod: ,,, | Performed by: CLINICAL MEDICAL LABORATORY

## 2024-07-22 PROCEDURE — 1159F MED LIST DOCD IN RCRD: CPT | Mod: CPTII,,,

## 2024-07-22 PROCEDURE — 80061 LIPID PANEL: CPT | Mod: ,,, | Performed by: CLINICAL MEDICAL LABORATORY

## 2024-07-22 PROCEDURE — 1160F RVW MEDS BY RX/DR IN RCRD: CPT | Mod: CPTII,,,

## 2024-07-22 RX ORDER — CLORAZEPATE DIPOTASSIUM 7.5 MG/1
7.5 TABLET ORAL 2 TIMES DAILY
COMMUNITY
Start: 2024-06-13 | End: 2024-07-22

## 2024-07-22 RX ORDER — CLORAZEPATE DIPOTASSIUM 7.5 MG/1
7.5 TABLET ORAL 2 TIMES DAILY
Qty: 60 TABLET | Refills: 2 | Status: SHIPPED | OUTPATIENT
Start: 2024-07-22

## 2024-07-22 RX ORDER — LISINOPRIL 5 MG/1
5 TABLET ORAL DAILY
Qty: 90 TABLET | Refills: 3 | Status: SHIPPED | OUTPATIENT
Start: 2024-07-22 | End: 2025-07-22

## 2024-07-22 NOTE — ASSESSMENT & PLAN NOTE
" Goal LDL is less than 70. Continue current meds and low fat/low cholesterol diet with increased exercise as tolerated. Will follow up with labs. Patient to follow up in 3 months or as needed    A few changes in your diet can reduce cholesterol and improve your heart health. Saturated fats, found primarily in red meat and full-fat dairy products, raise your total cholesterol. Decreasing your consumption of saturated fats can reduce your low-density lipoprotein (LDL) cholesterol. rans fats, sometimes listed on food labels as "partially hydrogenated vegetable oil," are often used in margarines and store-bought cookies, crackers and cakes.     Trans fats raise overall cholesterol levels. Omega-3 fatty acids don't affect LDL cholesterol. But they have other heart-healthy benefits, including reducing blood pressure. Foods with omega-3 fatty acids include salmon, mackerel, herring, walnuts and flaxseeds.Soluble fiber can reduce the absorption of cholesterol into your bloodstream. Soluble fiber is found in such foods as oatmeal, kidney beans, [place] sprouts, apples and pears.  at least 30 minutes of exercise five times a week or vigorous aerobic activity for 20 minutes three times a week if tolerated.   "

## 2024-07-22 NOTE — Clinical Note
Please obtain records from ENT Dr. Machado in [place], [place] including MRI.  Also obtain records from [facility] in [place] (US/breast & diagnostic mammogram) from Dr. Menendez.  Thanks!

## 2024-07-22 NOTE — PROGRESS NOTES
SHAYE MCWILLIAMS, ESVIN   [facility]  [address]  [place]  [address]      PATIENT NAME: Ami Flores  : 1960  DATE: 24  MRN: [id number]        Reason for Visit / Chief Complaint: Follow-up (Patient is a 63 year old female who presents to the clinic related 3 month follow up. Patient is fasting this morning for labwork.  ), Tinnitus (Patient is requesting a refill on tranxene, seen ENT in May,  last week. ), and Nephrolithiasis (Patient is requesting a refill on toradol related to chronic kidney stones. )         History of Present Illness / Problem Focused Workflow     62 y/o female presents to clinic for  3 month follow up. Patient is fasting this morning for labwork.  ), Tinnitus (Patient is requesting a refill on tranxene, seen ENT in May,  last week. ). Pt states ENT gave her tranxene for tinnitus and it does seem to help with this.       Review of Systems     @Review of Systems   Constitutional:  Negative for chills, fatigue and fever.   HENT:  Positive for tinnitus. Negative for nasal congestion, ear discharge, ear pain, rhinorrhea, sinus pressure/congestion and sore throat.    Respiratory:  Negative for cough, chest tightness, shortness of breath, wheezing and stridor.    Cardiovascular:  Negative for palpitations and claudication.   Gastrointestinal:  Negative for abdominal pain, constipation, diarrhea, nausea, vomiting and reflux.   Genitourinary:  Negative for dysuria, flank pain, frequency, hematuria and urgency.   Musculoskeletal:  Negative for myalgias.   Neurological:  Negative for dizziness, weakness, light-headedness and headaches.   Psychiatric/Behavioral:  Negative for suicidal ideas.        Medical / Social / Family History     Past Medical History:   Diagnosis Date    Anxiety     Hyperlipidemia     Kidney stones     Nephrolithiasis     since she was 13 years.  She has always passed the stones.  No procedures ever required until 2022    PONV  (postoperative nausea and vomiting)     Tinnitus, unspecified ear        Past Surgical History:   Procedure Laterality Date    ADENOIDECTOMY  1967    6 years old    APPENDECTOMY  1971    10 years old     SECTION  08/12/1983    x2 1985    CHOLECYSTECTOMY  2005    CYSTOSCOPY W/ URETERAL STENT PLACEMENT Left 2022    Procedure: CYSTOSCOPY, WITH URETERAL STENT INSERTION;  Surgeon: Nathen Pierre Jr., MD;  Location: [facility];  Service: Urology;  Laterality: Left;    ECTOPIC PREGNANCY SURGERY  1984    left foot Left 1972    heel    TONSILLECTOMY  1967    6 years old    TUBAL LIGATION      URETERAL STENT PLACEMENT  2022        URETERORENOSCOPY Left 2022    Procedure: URETERORENOSCOPY WITH LASER LITHOTRIPSY AND URETERAL STENT EXCHANGE with basket extraction of stone fragments;  Surgeon: Nathen Pierre Jr., MD;  Location: [facility] OR;  Service: Urology;  Laterality: Left;           Medications and Allergies     Medications  Outpatient Medications Marked as Taking for the 24 encounter (Office Visit) with Jamar Lindsey FNP   Medication Sig Dispense Refill    DUAVEE 0.45-20 mg Tab Take 1 tablet by mouth once daily.      fluticasone propionate (CUTIVATE) 0.05 % cream APPLY TO THE AFFECTED AREA(S) TWICE DAILY 30 g 2    lansoprazole (PREVACID) 30 MG capsule TAKE ONE CAPSULE BY MOUTH EVERY DAY BEFORE a meal Strength: 30 mg 30 capsule 3    lovastatin (MEVACOR) 10 MG tablet TAKE ONE TABLET BY MOUTH AT BEDTIME 30 tablet 2    naproxen (NAPROSYN) 500 MG tablet Take 1 tablet (500 mg total) by mouth 2 (two) times daily as needed (joint pain). 60 tablet 2    [DISCONTINUED] clorazepate (TRANXENE) 3.75 MG Tab Take 1 tablet (3.75 mg total) by mouth 2 (two) times daily as needed (anxiety). (Patient taking differently: Take 7.5 mg by mouth 2 (two) times daily as needed (anxiety).) 60 tablet 1    [DISCONTINUED] clorazepate (TRANXENE) 7.5 MG Tab Take 7.5 mg by mouth 2 (two) times  daily.         Allergies  Review of patient's allergies indicates:   Allergen Reactions    Hydrocodone Nausea And Vomiting    Penicillins Nausea Only       Physical Examination     Vitals:    07/22/24 0817   BP: 132/82   Pulse: 69   Resp: 20   Temp: 97.6 °F (36.4 °C)     Physical Exam  Vitals and nursing note reviewed.   Constitutional:       General: She is awake.      Appearance: Normal appearance.   HENT:      Head: Normocephalic.      Right Ear: Tympanic membrane, ear canal and external ear normal.      Left Ear: Tympanic membrane, ear canal and external ear normal.      Nose: Nose normal.      Mouth/Throat:      Lips: Pink.      Mouth: Mucous membranes are moist.      Pharynx: Oropharynx is clear. Uvula midline.   Cardiovascular:      Rate and Rhythm: Normal rate and regular rhythm.      Heart sounds: Normal heart sounds, S1 normal and S2 normal.   Pulmonary:      Effort: Pulmonary effort is normal. No respiratory distress.      Breath sounds: Normal breath sounds. No decreased breath sounds, wheezing, rhonchi or rales.   Abdominal:      General: Bowel sounds are normal.      Palpations: Abdomen is soft.      Tenderness: There is no abdominal tenderness.   Musculoskeletal:      Cervical back: Normal range of motion.   Skin:     General: Skin is warm.      Capillary Refill: Capillary refill takes less than 2 seconds.   Neurological:      Mental Status: She is alert and oriented to person, place, and time.   Psychiatric:         Thought Content: Thought content does not include homicidal or suicidal ideation. Thought content does not include homicidal or suicidal plan.               Procedures   Assessment and Plan (including Health Maintenance)   :    Plan:     Problem List Items Addressed This Visit          Psychiatric    Anxiety    Current Assessment & Plan     Tranxene BID as needed.   UDS performed today and is consistent with use.  checked and is OK. No signs of aberrant behavior. Instructed patient to  "only take medication as instructed and never share medications with others.             Relevant Medications    clorazepate (TRANXENE) 7.5 MG Tab       ENT    Tinnitus of both ears    Current Assessment & Plan     Pt is currently being followed by ENT         Relevant Medications    clorazepate (TRANXENE) 7.5 MG Tab       Cardiac/Vascular    Hyperlipidemia - Primary    Current Assessment & Plan      Goal LDL is less than 70. Continue current meds and low fat/low cholesterol diet with increased exercise as tolerated. Will follow up with labs. Patient to follow up in 3 months or as needed    A few changes in your diet can reduce cholesterol and improve your heart health. Saturated fats, found primarily in red meat and full-fat dairy products, raise your total cholesterol. Decreasing your consumption of saturated fats can reduce your low-density lipoprotein (LDL) cholesterol. rans fats, sometimes listed on food labels as "partially hydrogenated vegetable oil," are often used in margarines and store-bought cookies, crackers and cakes.     Trans fats raise overall cholesterol levels. Omega-3 fatty acids don't affect LDL cholesterol. But they have other heart-healthy benefits, including reducing blood pressure. Foods with omega-3 fatty acids include salmon, mackerel, herring, walnuts and flaxseeds.Soluble fiber can reduce the absorption of cholesterol into your bloodstream. Soluble fiber is found in such foods as oatmeal, kidney beans, [place] sprouts, apples and pears.  at least 30 minutes of exercise five times a week or vigorous aerobic activity for 20 minutes three times a week if tolerated.          Relevant Orders    Comprehensive Metabolic Panel    Lipid Panel    Primary hypertension    Current Assessment & Plan     Monitor BP daily and keep BP daily log to bring to next visit. Exercise at least 5 times a week. Keep scheduled appts. RTC sooner if BP is staying <120/70. Dash diet.    DASH- includes foods rich in " K+, calcium and Magnesium.The diet limits foods high in sodium, saturated fats and added sugars. This is a flexible balanced eating plan that helps create heart healthy eating style for life. Diet is rich in vegetable, whole grains and fruits. It includes fat free or low fat dairy products, fish, poultry, nuts. Chose foods high in K+, calcium, magnesium, fiber, protein, and low in saturated fats and sodium.          Relevant Medications    lisinopriL (PRINIVIL,ZESTRIL) 5 MG tablet       Oncology    Low iron    Current Assessment & Plan     Lab work today and will call pt with results and any new orders         Relevant Orders    CBC Auto Differential       Endocrine    Screening for diabetes mellitus (DM)    Current Assessment & Plan     Lab work today and will call pt with results and any new orders         Relevant Orders    Hemoglobin A1C       Palliative Care    High risk medication use    Current Assessment & Plan     UDS and  checked and appropriate         Relevant Orders    POCT Urine Drug Screen Presump       Health Maintenance Topics with due status: Not Due       Topic Last Completion Date    Hemoglobin A1c (Diabetic Prevention Screening) 09/19/2023    Influenza Vaccine 02/15/2024    Cervical Cancer Screening 04/11/2024    Mammogram 04/11/2024    Lipid Panel 04/11/2024       Future Appointments   Date Time Provider Department Center   9/19/2024  9:40 AM Jamar Lindsey FNP [facility]        Health Maintenance Due   Topic Date Due    TETANUS VACCINE  Never done    Colorectal Cancer Screening  Never done    Shingles Vaccine (1 of 2) Never done    RSV Vaccine (Age 60+ and Pregnant patients) (1 - 1-dose 60+ series) Never done    COVID-19 Vaccine (1 - 2023-24 season) Never done        Follow up in about 3 months (around 10/22/2024).     Signature:  ESVIN JOHNSON  [facility]  [address] [address] [place]  [address]    Date of encounter: 7/22/24

## 2024-08-05 ENCOUNTER — PATIENT OUTREACH (OUTPATIENT)
Facility: HOSPITAL | Age: 64
End: 2024-08-05
Payer: COMMERCIAL

## 2024-08-21 ENCOUNTER — PATIENT OUTREACH (OUTPATIENT)
Facility: HOSPITAL | Age: 64
End: 2024-08-21
Payer: COMMERCIAL

## 2024-08-21 NOTE — PROGRESS NOTES
Population Health Chart Review & Patient Outreach Details      Further Action Needed If Patient Returns Outreach:        24 upload recent ent notes to  and pcp TC,Lpn-CCC    Updates Requested / Reviewed:     []  Care Everywhere    []     []  External Sources ([facility], [facility], [facility], etc.)    [] [facility]   [] [facility]   [] Other:    []  Care Team Updated   []  Removed  or Duplicate Orders   []  Immunization Reconciliation Completed / Queried    [] [place]   [] [place]   [] [place]   [] [place]      Health Maintenance Topics Addressed and Outreach Outcomes / Actions Taken:             Breast Cancer Screening []  Mammogram Order Placed    []  Mammogram Screening Scheduled    []  External Records Requested & Care Team Updated if Applicable    []  External Records Uploaded & Care Team Updated if Applicable    []  Pt Declined Scheduling Mammogram    []  Pt Will Schedule with External Provider / Order Routed & Care Team Updated if Applicable              Cervical Cancer Screening []  Pap Smear Scheduled in Primary Care or OBGYN    []  External Records Requested & Care Team Updated if Applicable       []  External Records Uploaded, Care Team Updated, & History Updated if Applicable    []  Patient Declined Scheduling Pap Smear    []  Patient Will Schedule with External Provider & Care Team Updated if Applicable                  Colorectal Cancer Screening []  Colonoscopy Case Request / Referral / Home Test Order Placed    []  External Records Requested & Care Team Updated if Applicable    []  External Records Uploaded, Care Team Updated, & History Updated if Applicable    []  Patient Declined Completing Colon Cancer Screening    []  Patient Will Schedule with External Provider & Care Team Updated if Applicable    []  Fit Kit Mailed (add the SmartPhrase under additional notes)    []  Reminded Patient to Complete Home Test                Diabetic Eye Exam []  Eye Exam Screening Order Placed     []  Eye Camera Scheduled or Optometry/Ophthalmology Referral Placed    []  External Records Requested & Care Team Updated if Applicable    []  External Records Uploaded, Care Team Updated, & History Updated if Applicable    []  Patient Declined Scheduling Eye Exam    []  Patient Will Schedule with External Provider & Care Team Updated if Applicable             Blood Pressure Control []  Primary Care Follow Up Visit Scheduled     []  Remote Blood Pressure Reading Captured    []  Patient Declined Remote Reading or Scheduling Appt - Escalated to PCP    []  Patient Will Call Back or Send Portal Message with Reading                 HbA1c & Other Labs []  Overdue Lab(s) Ordered    []  Overdue Lab(s) Scheduled    []  External Records Uploaded & Care Team Updated if Applicable    []  Primary Care Follow Up Visit Scheduled     []  Reminded Patient to Complete A1c Home Test    []  Patient Declined Scheduling Labs or Will Call Back to Schedule    []  Patient Will Schedule with External Provider / Order Routed, & Care Team Updated if Applicable           Primary Care Appointment []  Primary Care Appt Scheduled    []  Patient Declined Scheduling or Will Call Back to Schedule    []  Pt Established with External Provider, Updated Care Team, & Informed Pt to Notify Payor if Applicable           Medication Adherence /    Statin Use []  Primary Care Appointment Scheduled    []  Patient Reminded to  Prescription    []  Patient Declined, Provider Notified if Needed    []  Sent Provider Message to Review to Evaluate Pt for Statin, Add Exclusion Dx Codes, Document   Exclusion in Problem List, Change Statin Intensity Level to Moderate or High Intensity if Applicable                Osteoporosis Screening []  Dexa Order Placed    []  Dexa Appointment Scheduled    []  External Records Requested & Care Team Updated    []  External Records Uploaded, Care Team Updated, & History Updated if Applicable    []  Patient Declined Scheduling  Dexa or Will Call Back to Schedule    []  Patient Will Schedule with External Provider / Order Routed & Care Team Updated if Applicable       Additional Notes:

## 2024-09-19 ENCOUNTER — OFFICE VISIT (OUTPATIENT)
Dept: FAMILY MEDICINE | Facility: CLINIC | Age: 64
End: 2024-09-19
Payer: COMMERCIAL

## 2024-09-19 VITALS
OXYGEN SATURATION: 98 % | SYSTOLIC BLOOD PRESSURE: 121 MMHG | BODY MASS INDEX: 29.27 KG/M2 | TEMPERATURE: 98 F | HEIGHT: 61 IN | WEIGHT: 155 LBS | DIASTOLIC BLOOD PRESSURE: 79 MMHG | RESPIRATION RATE: 18 BRPM | HEART RATE: 67 BPM

## 2024-09-19 DIAGNOSIS — E78.2 MIXED HYPERLIPIDEMIA: ICD-10-CM

## 2024-09-19 DIAGNOSIS — F41.9 ANXIETY: ICD-10-CM

## 2024-09-19 DIAGNOSIS — H93.13 TINNITUS OF BOTH EARS: ICD-10-CM

## 2024-09-19 DIAGNOSIS — E55.9 VITAMIN D DEFICIENCY: ICD-10-CM

## 2024-09-19 DIAGNOSIS — Z13.1 SCREENING FOR DIABETES MELLITUS (DM): ICD-10-CM

## 2024-09-19 DIAGNOSIS — M25.50 ARTHRALGIA, UNSPECIFIED JOINT: ICD-10-CM

## 2024-09-19 DIAGNOSIS — I10 PRIMARY HYPERTENSION: Primary | ICD-10-CM

## 2024-09-19 DIAGNOSIS — K21.9 GASTROESOPHAGEAL REFLUX DISEASE, UNSPECIFIED WHETHER ESOPHAGITIS PRESENT: ICD-10-CM

## 2024-09-19 LAB
25(OH)D3 SERPL-MCNC: 34.8 NG/ML
CHOLEST SERPL-MCNC: 229 MG/DL (ref 0–200)
CHOLEST/HDLC SERPL: 3.1 {RATIO}
GLUCOSE SERPL-MCNC: 103 MG/DL (ref 74–106)
HDLC SERPL-MCNC: 75 MG/DL (ref 40–60)
LDLC SERPL CALC-MCNC: 121 MG/DL
LDLC/HDLC SERPL: 1.6 {RATIO}
NONHDLC SERPL-MCNC: 154 MG/DL
TRIGL SERPL-MCNC: 165 MG/DL (ref 35–150)
VLDLC SERPL-MCNC: 33 MG/DL

## 2024-09-19 PROCEDURE — 3078F DIAST BP <80 MM HG: CPT | Mod: CPTII,,,

## 2024-09-19 PROCEDURE — 82306 VITAMIN D 25 HYDROXY: CPT | Mod: ,,, | Performed by: CLINICAL MEDICAL LABORATORY

## 2024-09-19 PROCEDURE — 82947 ASSAY GLUCOSE BLOOD QUANT: CPT | Mod: ,,, | Performed by: CLINICAL MEDICAL LABORATORY

## 2024-09-19 PROCEDURE — 80061 LIPID PANEL: CPT | Mod: ,,, | Performed by: CLINICAL MEDICAL LABORATORY

## 2024-09-19 PROCEDURE — 3044F HG A1C LEVEL LT 7.0%: CPT | Mod: CPTII,,,

## 2024-09-19 PROCEDURE — 3008F BODY MASS INDEX DOCD: CPT | Mod: CPTII,,,

## 2024-09-19 PROCEDURE — 1160F RVW MEDS BY RX/DR IN RCRD: CPT | Mod: CPTII,,,

## 2024-09-19 PROCEDURE — 4010F ACE/ARB THERAPY RXD/TAKEN: CPT | Mod: CPTII,,,

## 2024-09-19 PROCEDURE — 1159F MED LIST DOCD IN RCRD: CPT | Mod: CPTII,,,

## 2024-09-19 PROCEDURE — 99396 PREV VISIT EST AGE 40-64: CPT | Mod: ,,,

## 2024-09-19 PROCEDURE — 3074F SYST BP LT 130 MM HG: CPT | Mod: CPTII,,,

## 2024-09-19 RX ORDER — LOVASTATIN 10 MG/1
TABLET ORAL
Qty: 30 TABLET | Refills: 2 | Status: SHIPPED | OUTPATIENT
Start: 2024-09-19

## 2024-09-19 RX ORDER — LANSOPRAZOLE 30 MG/1
CAPSULE, DELAYED RELEASE ORAL
Qty: 30 CAPSULE | Refills: 3 | Status: SHIPPED | OUTPATIENT
Start: 2024-09-19

## 2024-09-19 RX ORDER — NAPROXEN 500 MG/1
500 TABLET ORAL 2 TIMES DAILY PRN
Qty: 60 TABLET | Refills: 2 | Status: SHIPPED | OUTPATIENT
Start: 2024-09-19

## 2024-09-19 RX ORDER — ERGOCALCIFEROL 1.25 MG/1
1 CAPSULE ORAL
COMMUNITY
End: 2024-10-02

## 2024-09-19 RX ORDER — FLUTICASONE PROPIONATE 0.5 MG/G
CREAM TOPICAL
Qty: 30 G | Refills: 2 | Status: SHIPPED | OUTPATIENT
Start: 2024-09-19

## 2024-09-19 RX ORDER — CLORAZEPATE DIPOTASSIUM 7.5 MG/1
7.5 TABLET ORAL 3 TIMES DAILY
Qty: 90 TABLET | Refills: 2 | Status: SHIPPED | OUTPATIENT
Start: 2024-09-19

## 2024-09-19 RX ORDER — LISINOPRIL 5 MG/1
5 TABLET ORAL DAILY
Qty: 90 TABLET | Refills: 3 | Status: SHIPPED | OUTPATIENT
Start: 2024-09-19 | End: 2025-09-19

## 2024-09-19 NOTE — PROGRESS NOTES
SHAYE MCWILLIAMS, ESVIN   [facility]  [address]  [place]  [address]      PATIENT NAME: Ami Flores  : 1960  DATE: 24  MRN: [id number]        Reason for Visit / Chief Complaint: Annual Exam (Ami Flores 63 year old female presents for annual [facility] Wellness Exam./) and Tinnitus (Patient would like to know if dosage on tranzene can be upped. Can she try an added medication like buspar?)         History of Present Illness / Problem Focused Workflow     64 y/o female presents to clinic for annual exam and medication refills. She denies any concerns or questions at present      Review of Systems     @Review of Systems   Constitutional:  Negative for chills, fatigue and fever.   HENT:  Negative for nasal congestion, ear discharge, ear pain, rhinorrhea, sinus pressure/congestion and sore throat.    Respiratory:  Negative for cough, chest tightness, shortness of breath, wheezing and stridor.    Cardiovascular:  Negative for palpitations and claudication.   Gastrointestinal:  Negative for abdominal pain, constipation, diarrhea, nausea, vomiting and reflux.   Genitourinary:  Negative for dysuria, flank pain, frequency, hematuria and urgency.   Musculoskeletal:  Negative for myalgias.   Neurological:  Negative for dizziness, weakness, light-headedness and headaches.   Psychiatric/Behavioral:  Negative for suicidal ideas.        Medical / Social / Family History     Past Medical History:   Diagnosis Date    Anxiety     Hyperlipidemia     Kidney stones     Nephrolithiasis     since she was 13 years.  She has always passed the stones.  No procedures ever required until 2022    PONV (postoperative nausea and vomiting)     Tinnitus, left ear 2024    Tinnitus, unspecified ear        Past Surgical History:   Procedure Laterality Date    ADENOIDECTOMY  1967    6 years old    APPENDECTOMY  1971    10 years old     SECTION  08/12/1983    x2 1985    CHOLECYSTECTOMY       CYSTOSCOPY W/ URETERAL STENT PLACEMENT Left 11/07/2022    Procedure: CYSTOSCOPY, WITH URETERAL STENT INSERTION;  Surgeon: Nathen Pierre Jr., MD;  Location: [facility];  Service: Urology;  Laterality: Left;    ECTOPIC PREGNANCY SURGERY  06/01/1984    left foot Left 06/1972    heel    TONSILLECTOMY  1967    6 years old    TUBAL LIGATION  1985    URETERAL STENT PLACEMENT  11/07/2022        URETERORENOSCOPY Left 11/17/2022    Procedure: URETERORENOSCOPY WITH LASER LITHOTRIPSY AND URETERAL STENT EXCHANGE with basket extraction of stone fragments;  Surgeon: Nathen Pierre Jr., MD;  Location: [facility] OR;  Service: Urology;  Laterality: Left;           Medications and Allergies     Medications  Outpatient Medications Marked as Taking for the 9/19/24 encounter (Office Visit) with Jamar Lindsey FNP   Medication Sig Dispense Refill    DUAVEE 0.45-20 mg Tab Take 1 tablet by mouth once daily.      [DISCONTINUED] clorazepate (TRANXENE) 7.5 MG Tab Take 1 tablet (7.5 mg total) by mouth 2 (two) times daily. 60 tablet 2    [DISCONTINUED] fluticasone propionate (CUTIVATE) 0.05 % cream APPLY TO THE AFFECTED AREA(S) TWICE DAILY 30 g 2    [DISCONTINUED] lansoprazole (PREVACID) 30 MG capsule TAKE ONE CAPSULE BY MOUTH EVERY DAY BEFORE a meal Strength: 30 mg 30 capsule 3    [DISCONTINUED] lisinopriL (PRINIVIL,ZESTRIL) 5 MG tablet Take 1 tablet (5 mg total) by mouth once daily. 90 tablet 3    [DISCONTINUED] lovastatin (MEVACOR) 10 MG tablet TAKE ONE TABLET BY MOUTH AT BEDTIME 30 tablet 2    [DISCONTINUED] naproxen (NAPROSYN) 500 MG tablet Take 1 tablet (500 mg total) by mouth 2 (two) times daily as needed (joint pain). 60 tablet 2       Allergies  Review of patient's allergies indicates:   Allergen Reactions    Hydrocodone Nausea And Vomiting    Penicillins Nausea Only       Physical Examination     Vitals:    09/19/24 0947   BP: 121/79   Pulse: 67   Resp: 18   Temp: 97.5 °F (36.4 °C)     Physical Exam  Vitals and nursing note  reviewed.   Constitutional:       General: She is awake.      Appearance: Normal appearance.   HENT:      Head: Normocephalic.      Right Ear: Tympanic membrane, ear canal and external ear normal.      Left Ear: Tympanic membrane, ear canal and external ear normal.      Nose: Nose normal.      Mouth/Throat:      Lips: Pink.      Mouth: Mucous membranes are moist.      Pharynx: Oropharynx is clear. Uvula midline.   Cardiovascular:      Rate and Rhythm: Normal rate and regular rhythm.      Heart sounds: Normal heart sounds, S1 normal and S2 normal.   Pulmonary:      Effort: Pulmonary effort is normal. No respiratory distress.      Breath sounds: Normal breath sounds. No decreased breath sounds, wheezing, rhonchi or rales.   Abdominal:      General: Bowel sounds are normal.      Palpations: Abdomen is soft.      Tenderness: There is no abdominal tenderness.   Musculoskeletal:      Cervical back: Normal range of motion.   Skin:     General: Skin is warm.      Capillary Refill: Capillary refill takes less than 2 seconds.   Neurological:      Mental Status: She is alert and oriented to person, place, and time.   Psychiatric:         Thought Content: Thought content does not include homicidal or suicidal ideation. Thought content does not include homicidal or suicidal plan.               Procedures   Assessment and Plan (including Health Maintenance)   :    Plan:     Problem List Items Addressed This Visit          Psychiatric    Anxiety    Current Assessment & Plan     Tranxene BID as needed.   UDS performed today and is consistent with use.  checked and is OK. No signs of aberrant behavior. Instructed patient to only take medication as instructed and never share medications with others.             Relevant Medications    clorazepate (TRANXENE) 7.5 MG Tab       ENT    Tinnitus of both ears    Current Assessment & Plan     Pt is currently being followed by ENT         Relevant Medications    clorazepate (TRANXENE) 7.5  "MG Tab       Cardiac/Vascular    Hyperlipidemia    Current Assessment & Plan      Goal LDL is less than 70. Continue current meds and low fat/low cholesterol diet with increased exercise as tolerated. Will follow up with labs. Patient to follow up in 3 months or as needed    A few changes in your diet can reduce cholesterol and improve your heart health. Saturated fats, found primarily in red meat and full-fat dairy products, raise your total cholesterol. Decreasing your consumption of saturated fats can reduce your low-density lipoprotein (LDL) cholesterol. rans fats, sometimes listed on food labels as "partially hydrogenated vegetable oil," are often used in margarines and store-bought cookies, crackers and cakes.     Trans fats raise overall cholesterol levels. Omega-3 fatty acids don't affect LDL cholesterol. But they have other heart-healthy benefits, including reducing blood pressure. Foods with omega-3 fatty acids include salmon, mackerel, herring, walnuts and flaxseeds.Soluble fiber can reduce the absorption of cholesterol into your bloodstream. Soluble fiber is found in such foods as oatmeal, kidney beans, [place] sprouts, apples and pears.  at least 30 minutes of exercise five times a week or vigorous aerobic activity for 20 minutes three times a week if tolerated.          Relevant Medications    lovastatin (MEVACOR) 10 MG tablet    Other Relevant Orders    Lipid Panel (Completed)    Primary hypertension - Primary    Current Assessment & Plan     Monitor BP daily and keep BP daily log to bring to next visit. Exercise at least 5 times a week. Keep scheduled appts. RTC sooner if BP is staying <120/70. Dash diet.    DASH- includes foods rich in K+, calcium and Magnesium.The diet limits foods high in sodium, saturated fats and added sugars. This is a flexible balanced eating plan that helps create heart healthy eating style for life. Diet is rich in vegetable, whole grains and fruits. It includes fat free or " low fat dairy products, fish, poultry, nuts. Chose foods high in K+, calcium, magnesium, fiber, protein, and low in saturated fats and sodium.          Relevant Medications    lisinopriL (PRINIVIL,ZESTRIL) 5 MG tablet       Endocrine    Vitamin D deficiency    Current Assessment & Plan     Continue current medication regimen         Relevant Orders    Vitamin D (Completed)    Screening for diabetes mellitus (DM)    Current Assessment & Plan     Lab work today and will call pt with results and any new orders         Relevant Orders    Glucose, Fasting (Completed)       GI    GERD (gastroesophageal reflux disease)    Current Assessment & Plan     Symptoms controlled well   Reviewed pathology of current symptoms, medication side effects/risk/benefits/directions on taking medications, and to take medication as prescribed. Take medication as prescribed. Remain upright for at least one hour after eating or drinking, raise HOB 6-8 inches. Eat small, frequent bland meals instead of large meals. Avoid greasy/spicy/acidic foods. Avoid alcohol and NSAIDS. Monitor for changes in bowel color, texture, etc. Discussed what warrants emergent follow-up or treatment. Patient is to go to ED if they begin vomiting and it looks like blood or coffee grounds. Weight loss may help people who are overweight to reduce acid reflux. Weight loss has a number of other health benefits including decreases risk of heart disease and type 2 DM.         Relevant Medications    lansoprazole (PREVACID) 30 MG capsule       Orthopedic    Arthralgia    Current Assessment & Plan     Medication controlling symptoms. Continue current regimen. Follow up as needed         Relevant Medications    naproxen (NAPROSYN) 500 MG tablet       Health Maintenance Topics with due status: Not Due       Topic Last Completion Date    Cervical Cancer Screening 04/11/2024    Mammogram 06/17/2024    Hemoglobin A1c (Diabetic Prevention Screening) 07/22/2024    Lipid Panel  09/19/2024       Future Appointments   Date Time Provider Department Center   9/19/2025  9:00 AM Jamar Lindsey FNP [facility]        Health Maintenance Due   Topic Date Due    TETANUS VACCINE  Never done    Colorectal Cancer Screening  Never done    Shingles Vaccine (1 of 2) Never done    RSV Vaccine (Age 60+ and Pregnant patients) (1 - Risk 60-74 years 1-dose series) Never done    Influenza Vaccine (1) 09/01/2024    COVID-19 Vaccine (1 - 2024-25 season) Never done        Follow up in about 3 months (around 12/19/2024).     Signature:  ESVIN JOHNSON  [address] [place]  [address]    Date of encounter: 9/19/24

## 2024-10-02 NOTE — ASSESSMENT & PLAN NOTE
" Goal LDL is less than 70. Continue current meds and low fat/low cholesterol diet with increased exercise as tolerated. Will follow up with labs. Patient to follow up in 3 months or as needed    A few changes in your diet can reduce cholesterol and improve your heart health. Saturated fats, found primarily in red meat and full-fat dairy products, raise your total cholesterol. Decreasing your consumption of saturated fats can reduce your low-density lipoprotein (LDL) cholesterol. rans fats, sometimes listed on food labels as "partially hydrogenated vegetable oil," are often used in margarines and store-bought cookies, crackers and cakes.     Trans fats raise overall cholesterol levels. Omega-3 fatty acids don't affect LDL cholesterol. But they have other heart-healthy benefits, including reducing blood pressure. Foods with omega-3 fatty acids include salmon, mackerel, herring, walnuts and flaxseeds.Soluble fiber can reduce the absorption of cholesterol into your bloodstream. Soluble fiber is found in such foods as oatmeal, kidney beans, [place] sprouts, apples and pears.  at least 30 minutes of exercise five times a week or vigorous aerobic activity for 20 minutes three times a week if tolerated.   "

## 2024-10-21 PROBLEM — Z13.1 SCREENING FOR DIABETES MELLITUS (DM): Status: RESOLVED | Noted: 2024-07-22 | Resolved: 2024-10-21

## 2024-11-04 DIAGNOSIS — R25.2 SPASM: Primary | ICD-10-CM

## 2024-11-04 RX ORDER — CYCLOBENZAPRINE HCL 10 MG
10 TABLET ORAL 2 TIMES DAILY PRN
Qty: 45 TABLET | Refills: 0 | Status: SHIPPED | OUTPATIENT
Start: 2024-11-04

## 2025-04-22 ENCOUNTER — OFFICE VISIT (OUTPATIENT)
Dept: FAMILY MEDICINE | Facility: CLINIC | Age: 65
End: 2025-04-22
Payer: MEDICARE

## 2025-04-22 VITALS
RESPIRATION RATE: 17 BRPM | SYSTOLIC BLOOD PRESSURE: 133 MMHG | HEART RATE: 66 BPM | HEIGHT: 61 IN | TEMPERATURE: 98 F | WEIGHT: 149.38 LBS | OXYGEN SATURATION: 100 % | DIASTOLIC BLOOD PRESSURE: 78 MMHG | BODY MASS INDEX: 28.2 KG/M2

## 2025-04-22 DIAGNOSIS — F41.9 ANXIETY: Primary | ICD-10-CM

## 2025-04-22 DIAGNOSIS — H93.13 TINNITUS OF BOTH EARS: ICD-10-CM

## 2025-04-22 DIAGNOSIS — M25.50 ARTHRALGIA, UNSPECIFIED JOINT: ICD-10-CM

## 2025-04-22 DIAGNOSIS — Z79.899 LONG-TERM USE OF HIGH-RISK MEDICATION: ICD-10-CM

## 2025-04-22 PROCEDURE — 80305 DRUG TEST PRSMV DIR OPT OBS: CPT | Mod: RHCUB

## 2025-04-22 PROCEDURE — 1160F RVW MEDS BY RX/DR IN RCRD: CPT | Mod: ,,,

## 2025-04-22 PROCEDURE — 3075F SYST BP GE 130 - 139MM HG: CPT | Mod: ,,,

## 2025-04-22 PROCEDURE — 99214 OFFICE O/P EST MOD 30 MIN: CPT | Mod: ,,,

## 2025-04-22 PROCEDURE — 3288F FALL RISK ASSESSMENT DOCD: CPT | Mod: ,,,

## 2025-04-22 PROCEDURE — 1126F AMNT PAIN NOTED NONE PRSNT: CPT | Mod: ,,,

## 2025-04-22 PROCEDURE — 3008F BODY MASS INDEX DOCD: CPT | Mod: ,,,

## 2025-04-22 PROCEDURE — 3078F DIAST BP <80 MM HG: CPT | Mod: ,,,

## 2025-04-22 PROCEDURE — 1101F PT FALLS ASSESS-DOCD LE1/YR: CPT | Mod: ,,,

## 2025-04-22 PROCEDURE — 1159F MED LIST DOCD IN RCRD: CPT | Mod: ,,,

## 2025-04-23 LAB
CTP QC/QA: YES
POC (AMP) AMPHETAMINE: NEGATIVE
POC (BAR) BARBITURATES: NEGATIVE
POC (BUP) BUPRENORPHINE: ABNORMAL
POC (BZO) BENZODIAZEPINES: ABNORMAL
POC (COC) COCAINE: NEGATIVE
POC (MDMA) METHYLENEDIOXYMETHAMPHETAMINE 3,4: NEGATIVE
POC (MET) METHAMPHETAMINE: NEGATIVE
POC (MOP) OPIATES: NEGATIVE
POC (MTD) METHADONE: NEGATIVE
POC (OXY) OXYCODONE: NEGATIVE
POC (PCP) PHENCYCLIDINE: NEGATIVE
POC (TCA) TRICYCLIC ANTIDEPRESSANTS: NEGATIVE
POC TEMPERATURE (URINE): ABNORMAL
POC THC: NEGATIVE

## 2025-04-24 RX ORDER — KETOROLAC TROMETHAMINE 10 MG/1
10 TABLET, FILM COATED ORAL EVERY 6 HOURS
Qty: 20 TABLET | Refills: 0 | Status: SHIPPED | OUTPATIENT
Start: 2025-04-24 | End: 2025-04-29

## 2025-04-24 RX ORDER — CLORAZEPATE DIPOTASSIUM 7.5 MG/1
7.5 TABLET ORAL 3 TIMES DAILY
Qty: 90 TABLET | Refills: 2 | Status: SHIPPED | OUTPATIENT
Start: 2025-04-24

## 2025-04-24 RX ORDER — METHOCARBAMOL 500 MG/1
500 TABLET, FILM COATED ORAL 4 TIMES DAILY
Qty: 40 TABLET | Refills: 0 | Status: SHIPPED | OUTPATIENT
Start: 2025-04-24 | End: 2025-05-04

## 2025-05-15 NOTE — ASSESSMENT & PLAN NOTE
Toradol and Robaxin RX today. These have helped flare ups in the past. RTC with worsening, new or persistent symptoms.

## 2025-05-15 NOTE — PROGRESS NOTES
SHAYE MCWILLIAMS, ESVIN   [place] [id number]  [place]  [address]        PATIENT NAME: Ami Flores  : 1960  DATE: 25  MRN: [id number]        Reason for Visit / Chief Complaint: Tinnitus (Med refill on tranxene ) and Health Maintenance (TETANUS VACCINE Never done/Colorectal Cancer Screening Never done/Shingles Vaccine(1 of 2) Never done/Pneumococcal Vaccines (Age 50+)(1 of 1 - PCV) Never done/RSV Vaccine (Age 60+ and Pregnant patients)(1 - Risk 60-74 years 1-dose series) Never done/DEXA Scan due on 2023/Influenza Vaccine(1) due on 2024/COVID-19 Vaccine( season) Never done/Mammogram  Completed)       Update PCP  Update Chief Complaint         History of Present Illness / Problem Focused Workflow     Ami Flores presents to the clinic with Tinnitus (Med refill on tranxene ) and Health Maintenance (TETANUS VACCINE Never done/Colorectal Cancer Screening Never done/Shingles Vaccine(1 of 2) Never done/Pneumococcal Vaccines (Age 50+)(1 of 1 - PCV) Never done/RSV Vaccine (Age 60+ and Pregnant patients)(1 - Risk 60-74 years 1-dose series) Never done/DEXA Scan due on 2023/Influenza Vaccine(1) due on 2024/COVID-19 Vaccine( season) Never done/Mammogram  Completed)     65 y/o female presents to clinic for medication refills. She states Tranxene is helping control symptoms well. She is also complaining of flare up of joint pain. States she used to take toradol and Robaxin when in flare and this seemed to help. Denies any other concerns or questions at present        Review of Systems     Review of Systems   Constitutional:  Negative for chills, fatigue and fever.   HENT:  Negative for nasal congestion, ear discharge, ear pain, rhinorrhea, sinus pressure/congestion and sore throat.    Respiratory:  Negative for cough, chest tightness, shortness of breath, wheezing and stridor.    Cardiovascular:  Negative for palpitations and claudication.    Gastrointestinal:  Negative for abdominal pain, constipation, diarrhea, nausea, vomiting and reflux.   Genitourinary:  Negative for dysuria, flank pain, frequency, hematuria and urgency.   Musculoskeletal:  Negative for myalgias.   Neurological:  Negative for dizziness, weakness, light-headedness and headaches.   Psychiatric/Behavioral:  Negative for suicidal ideas.         Medical / Social / Family History     Past Medical History:   Diagnosis Date    Anxiety     Hyperlipidemia     Kidney stones     Nephrolithiasis     since she was 13 years.  She has always passed the stones.  No procedures ever required until 2022    PONV (postoperative nausea and vomiting)     Tinnitus, left ear 2024    Tinnitus, unspecified ear        Past Surgical History:   Procedure Laterality Date    ADENOIDECTOMY  1967    6 years old    APPENDECTOMY  1971    10 years old     SECTION  08/12/1983    x2 1985    CHOLECYSTECTOMY      CYSTOSCOPY W/ URETERAL STENT PLACEMENT Left 2022    Procedure: CYSTOSCOPY, WITH URETERAL STENT INSERTION;  Surgeon: Nathen Pierre Jr., MD;  Location: [facility];  Service: Urology;  Laterality: Left;    ECTOPIC PREGNANCY SURGERY  1984    left foot Left 1972    heel    TONSILLECTOMY  1967    6 years old    TUBAL LIGATION      URETERAL STENT PLACEMENT  2022        URETERORENOSCOPY Left 2022    Procedure: URETERORENOSCOPY WITH LASER LITHOTRIPSY AND URETERAL STENT EXCHANGE with basket extraction of stone fragments;  Surgeon: Nathen Pierre Jr., MD;  Location: [facility];  Service: Urology;  Laterality: Left;       Social History  Ms.  reports that she has never smoked. She has never been exposed to tobacco smoke. She has never used smokeless tobacco. She reports that she does not drink alcohol and does not use drugs.    Family History  Ms.'s family history includes Diabetes in her paternal grandmother and son; Hearing loss in her mother; No Known  "Problems in her daughter, maternal grandfather, maternal grandmother, and paternal grandfather; Stroke in her father; Thyroid disease in her mother and sister; Tinnitus in her father, sister, and sister; Ulcerative colitis in her son.    Medications and Allergies     Medications  Outpatient Medications Marked as Taking for the 4/22/25 encounter (Office Visit) with Jamar Lindsey FNP   Medication Sig Dispense Refill    DUAVEE 0.45-20 mg Tab Take 1 tablet by mouth once daily.      fluticasone propionate (CUTIVATE) 0.05 % cream APPLY TO THE AFFECTED AREA(S) TWICE DAILY 30 g 2    lansoprazole (PREVACID) 30 MG capsule TAKE ONE CAPSULE BY MOUTH EVERY DAY BEFORE a meal Strength: 30 mg 30 capsule 3    naproxen (NAPROSYN) 500 MG tablet Take 1 tablet (500 mg total) by mouth 2 (two) times daily as needed (joint pain). 60 tablet 2       Allergies  Review of patient's allergies indicates:   Allergen Reactions    Hydrocodone Nausea And Vomiting    Penicillins Nausea Only       Physical Examination   /78 (BP Location: Right arm, Patient Position: Sitting)   Pulse 66   Temp 97.5 °F (36.4 °C) (Oral)   Resp 17   Ht 5' 1" (1.549 m)   Wt 67.8 kg (149 lb 6.4 oz)   LMP  (LMP Unknown)   SpO2 100%   BMI 28.23 kg/m²    Physical Exam  Vitals and nursing note reviewed.   Constitutional:       General: She is awake.      Appearance: Normal appearance.   HENT:      Head: Normocephalic.      Right Ear: Tympanic membrane, ear canal and external ear normal.      Left Ear: Tympanic membrane, ear canal and external ear normal.      Nose: Nose normal.      Mouth/Throat:      Lips: Pink.      Mouth: Mucous membranes are moist.      Pharynx: Oropharynx is clear. Uvula midline.   Cardiovascular:      Rate and Rhythm: Normal rate and regular rhythm.      Heart sounds: Normal heart sounds, S1 normal and S2 normal.   Pulmonary:      Effort: Pulmonary effort is normal. No respiratory distress.      Breath sounds: Normal breath sounds. No " decreased breath sounds, wheezing, rhonchi or rales.   Abdominal:      General: Bowel sounds are normal.      Palpations: Abdomen is soft.      Tenderness: There is no abdominal tenderness.   Musculoskeletal:      Cervical back: Normal range of motion.   Skin:     General: Skin is warm.      Capillary Refill: Capillary refill takes less than 2 seconds.   Neurological:      Mental Status: She is alert and oriented to person, place, and time.   Psychiatric:         Thought Content: Thought content does not include homicidal or suicidal ideation. Thought content does not include homicidal or suicidal plan.          Assessment and Plan (including Health Maintenance)      Problem List  Smart Sets  Document Outside HM   :    Plan: Follow up 3 months        Health Maintenance Due   Topic Date Due    TETANUS VACCINE  Never done    Colorectal Cancer Screening  Never done    Shingles Vaccine (1 of 2) Never done    Pneumococcal Vaccines (Age 50+) (1 of 1 - PCV) Never done    RSV Vaccine (Age 60+ and Pregnant patients) (1 - Risk 60-74 years 1-dose series) Never done    DEXA Scan  09/23/2023    COVID-19 Vaccine (1 - 2024-25 season) Never done    Mammogram  06/17/2025       Problem List Items Addressed This Visit       Anxiety - Primary    Current Assessment & Plan   Tranxene BID as needed.   UDS performed today and is consistent with use.  checked and is OK. No signs of aberrant behavior. Instructed patient to only take medication as instructed and never share medications with others.             Relevant Medications    clorazepate (TRANXENE) 7.5 MG Tab    Arthralgia    Current Assessment & Plan   Toradol and Robaxin RX today. These have helped flare ups in the past. RTC with worsening, new or persistent symptoms.          Long-term use of high-risk medication    Relevant Orders    POCT Urine Drug Screen Presump (Completed)    Tinnitus of both ears    Relevant Medications    clorazepate (TRANXENE) 7.5 MG Tab       Health  Maintenance Topics with due status: Not Due       Topic Last Completion Date    Influenza Vaccine 02/15/2024    Hemoglobin A1c (Diabetic Prevention Screening) 07/22/2024    Lipid Panel 09/19/2024       Future Appointments   Date Time Provider Department Center   7/24/2025 11:00 AM Jamar Lindsey FNP [facility] [place]   9/11/2025  1:00 PM Roopa Santiago MD [facility]   9/19/2025  9:00 AM Jamar Lindsey FNP [facility] [place]            Signature:      [facility]  [address] [place] [address], [place]  [id number]       Date of encounter: 4/22/25

## 2025-05-16 RX ORDER — FLUTICASONE PROPIONATE 0.5 MG/G
CREAM TOPICAL
Qty: 30 G | Refills: 2 | OUTPATIENT
Start: 2025-05-16

## 2025-05-16 RX ORDER — FLUTICASONE PROPIONATE 0.5 MG/G
CREAM TOPICAL
Qty: 30 G | Refills: 2 | Status: SHIPPED | OUTPATIENT
Start: 2025-05-16

## 2025-05-16 NOTE — TELEPHONE ENCOUNTER
Patient was seen 4/28/2025 by Jamar Lindsey and forgot to request a refill on   fluticasone propionate (CUTIVATE) 0.05 % cream 30 g 2 9/19/2024 --    Sig: APPLY TO THE AFFECTED AREA(S) TWICE DAILY        Prescription previously written by Dr. Nghia Ndiaye. She uses it for the skin on outside of both ears and behind both ears to keep skin from cracking and causing a rash. This has been an ongoing problem for many years and this cream is the only one she has found that helps maintain skin.

## 2025-05-18 ENCOUNTER — PATIENT MESSAGE (OUTPATIENT)
Dept: FAMILY MEDICINE | Facility: CLINIC | Age: 65
End: 2025-05-18
Payer: MEDICARE

## 2025-06-19 DIAGNOSIS — F41.9 ANXIETY: ICD-10-CM

## 2025-06-19 DIAGNOSIS — H93.13 TINNITUS OF BOTH EARS: ICD-10-CM

## 2025-06-19 RX ORDER — CLORAZEPATE DIPOTASSIUM 7.5 MG/1
7.5 TABLET ORAL 3 TIMES DAILY
Qty: 90 TABLET | Refills: 2 | OUTPATIENT
Start: 2025-06-19

## 2025-06-19 NOTE — TELEPHONE ENCOUNTER
CLORAZ DIPOT TAB 7.5MG is not covered. The requested medication with the submitted Generic  Product Identifier (GPI)/National Drug Code (NDC) is not properly listed with the Food and Drug  Administration (FDA) and does not meet regulatory requirements that would constitute a Part D-eligible  drug. Therefore, the medication is not covered under your Part D prescription drug plan.    Can we change the prescription to be just name brand? Maybe that will work to cover this medication.

## 2025-06-20 NOTE — TELEPHONE ENCOUNTER
Patient called back and said that she has been trying to call around to pharmacies that could get her medication for her and she states that [facility] can get it. She asked that when we send it in if we could ask them how much it may be there. I told her I would try.

## 2025-06-20 NOTE — TELEPHONE ENCOUNTER
[facility] pharmacy called and stated they are unable to get brand name Tranxene filled at pharmacy. She further stated she did get medication approved through different  but unfortunately they do not have an account set up with that particular , so patient will have to get medication filled at another pharmacy.     VM left for patient to return call to see which pharmacy she would like medication filled with.

## 2025-06-23 ENCOUNTER — PATIENT MESSAGE (OUTPATIENT)
Dept: FAMILY MEDICINE | Facility: CLINIC | Age: 65
End: 2025-06-23
Payer: MEDICARE

## 2025-06-24 ENCOUNTER — PATIENT MESSAGE (OUTPATIENT)
Dept: FAMILY MEDICINE | Facility: CLINIC | Age: 65
End: 2025-06-24
Payer: MEDICARE

## 2025-06-24 RX ORDER — CLORAZEPATE DIPOTASSIUM 7.5 MG/1
7.5 TABLET ORAL 3 TIMES DAILY
Qty: 90 TABLET | Refills: 2 | Status: SHIPPED | OUTPATIENT
Start: 2025-06-24

## (undated) DEVICE — KIT CYSTO RUSH

## (undated) DEVICE — GLOVE PROTEXIS PI SYN SURG 7.5

## (undated) DEVICE — GOWN NONREINF SET-IN SLV 2XL

## (undated) DEVICE — EXTRACTOR STONE 1.5FR X 115 CM

## (undated) DEVICE — GUIDEWIRE ZIPWIRE STR .038 150

## (undated) DEVICE — SOL NACL IRR 3000ML

## (undated) DEVICE — FIBER MOSES 200 DFL

## (undated) DEVICE — BAG LINGEMAN DRAIN UROLOGY

## (undated) DEVICE — SHEATH NAVIGATOR URETERAL

## (undated) DEVICE — CATH AXXCESS URET 6F 70CM

## (undated) DEVICE — SET IRRI FLUID WARMING 300MMHG

## (undated) DEVICE — SET CYSTO IRR DRP CHMBR 84IN